# Patient Record
Sex: MALE | Race: WHITE | Employment: FULL TIME | URBAN - METROPOLITAN AREA
[De-identification: names, ages, dates, MRNs, and addresses within clinical notes are randomized per-mention and may not be internally consistent; named-entity substitution may affect disease eponyms.]

---

## 2017-01-20 ENCOUNTER — OFFICE VISIT (OUTPATIENT)
Dept: URGENT CARE | Facility: URGENT CARE | Age: 57
End: 2017-01-20
Payer: OTHER MISCELLANEOUS

## 2017-01-20 ENCOUNTER — RADIANT APPOINTMENT (OUTPATIENT)
Dept: GENERAL RADIOLOGY | Facility: CLINIC | Age: 57
End: 2017-01-20
Attending: FAMILY MEDICINE
Payer: OTHER MISCELLANEOUS

## 2017-01-20 VITALS
WEIGHT: 176 LBS | SYSTOLIC BLOOD PRESSURE: 104 MMHG | BODY MASS INDEX: 24.56 KG/M2 | DIASTOLIC BLOOD PRESSURE: 70 MMHG | TEMPERATURE: 98 F | OXYGEN SATURATION: 95 % | HEART RATE: 68 BPM

## 2017-01-20 DIAGNOSIS — S82.831A CLOSED FRACTURE OF DISTAL END OF RIGHT FIBULA, UNSPECIFIED FRACTURE MORPHOLOGY, INITIAL ENCOUNTER: Primary | ICD-10-CM

## 2017-01-20 DIAGNOSIS — S99.911A ANKLE INJURY, RIGHT, INITIAL ENCOUNTER: ICD-10-CM

## 2017-01-20 PROCEDURE — 99213 OFFICE O/P EST LOW 20 MIN: CPT | Performed by: FAMILY MEDICINE

## 2017-01-20 PROCEDURE — 73610 X-RAY EXAM OF ANKLE: CPT | Mod: RT

## 2017-01-20 NOTE — PROGRESS NOTES
SUBJECTIVE:  Chief Complaint   Patient presents with     Ankle Pain     rt ankle pain,slipped and fell at work     Work Comp   .hemalatha presents with a chief complaint of right ankle.  The injury occurred 1 hours ago.   The injury happened while at work.   How: trauma: rolled ankle, heard pop immediate pain  The patient complained of moderate and severe pain and has had decreased ROM.    Pain exacerbated by weight-bearing and movement    He treated it initially with ice.   This is the first time this type of injury has occurred to this patient.     Past Medical History   Diagnosis Date     Bipolar disorder (H)        Past Surgical History   Procedure Laterality Date     Colonoscopy  09/26/13     [facility]       Family History   Problem Relation Age of Onset     DIABETES Father      GASTROINTESTINAL DISEASE Father      multiple precancerous polyps     Cardiovascular Mother      cardiomyopathy     Depression Mother      Cancer - colorectal Maternal Uncle        Social History   Substance Use Topics     Smoking status: Current Every Day Smoker -- 1.00 packs/day     Types: Cigarettes     Smokeless tobacco: Never Used      Comment: 1/2 PPD     Alcohol Use: No     ROSINTEGUMENTARY/SKIN: NEGATIVE for open wound/bleeding and NEGATIVE for bruising  MUSCULOSKELETAL: NEGATIVE for joint swelling, paresthesias, radicular pain  NEURO: NEGATIVE for numbness, paresthesias, weakness    EXAM:/70 mmHg  Pulse 68  Temp(Src) 98  F (36.7  C) (Oral)  Wt 176 lb (79.833 kg)  SpO2 95% Gen: healthy,alert,no distress  Extremity: ankle has pain with palpation and rom.   There is not compromise to the distal circulation.  Pulses are +2 and CRT is brisk.GENERAL APPEARANCE: healthy, alert and no distress  EXTREMITIES: peripheral pulses normal  SKIN: no suspicious lesions or rashes  NEURO: Normal strength and tone, sensory exam grossly normal, mentation intact and speech normal    xr with distal fib fx, read by Dr. Scott  Maninder        ICD-10-CM    1. Closed fracture of distal end of right fibula, unspecified fracture morphology, initial encounter S82.831A order for DME     PODIATRY/FOOT & ANKLE SURGERY REFERRAL   2. Ankle injury, right, initial encounter S99.911A XR Ankle Right G/E 3 Views     order for DME     PODIATRY/FOOT & ANKLE SURGERY REFERRAL     RICE

## 2017-01-20 NOTE — NURSING NOTE
"Chief Complaint   Patient presents with     Ankle Pain     rt ankle pain,slipped and fell at work     Work Comp       Initial /70 mmHg  Pulse 68  Temp(Src) 98  F (36.7  C) (Oral)  Wt 176 lb (79.833 kg)  SpO2 95% Estimated body mass index is 24.56 kg/(m^2) as calculated from the following:    Height as of 10/12/15: 5' 11\" (1.803 m).    Weight as of this encounter: 176 lb (79.833 kg).  BP completed using cuff size: regular    "

## 2017-01-20 NOTE — MR AVS SNAPSHOT
After Visit Summary   1/20/2017    Colby Trent    MRN: [id number]           Patient Information     Date Of Birth          1960        Visit Information        Provider Department      1/20/2017 10:15 AM Tyler Dickens DO [facility]        Today's Diagnoses     Closed fracture of distal end of right fibula, unspecified fracture morphology, initial encounter    -  1     Ankle injury, right, initial encounter            Follow-ups after your visit        Additional Services     PODIATRY/FOOT & ANKLE SURGERY REFERRAL       Your provider has referred you to: FMG: [facility] [phone]   [email or web address]  FMG: [facility] [phone]   [email or web address]  FMG: [facility] [phone]   [email or web address]  FMG: [facility] [phone]   [email or web address]    Please be aware that coverage of these services is subject to the terms and limitations of your health insurance plan.  Call member services at your health plan with any benefit or coverage questions.      Please bring the following to your appointment:  >>   Any x-rays, CTs or MRIs which have been performed.  Contact the facility where they were done to arrange for  prior to your scheduled appointment.    >>   List of current medications   >>   This referral request   >>   Any documents/labs given to you for this referral                  Who to contact     If you have questions or need follow up information about today's clinic visit or your schedule please contact [facility] directly at [phone].  Normal or non-critical lab and imaging results will be communicated to you by MyChart, letter or phone within 4 business days after the clinic has received  "the results. If you do not hear from us within 7 days, please contact the clinic through [employer] or phone. If you have a critical or abnormal lab result, we will notify you by phone as soon as possible.  Submit refill requests through [employer] or call your pharmacy and they will forward the refill request to us. Please allow 3 business days for your refill to be completed.          Additional Information About Your Visit        [employer] Information     [employer] lets you send messages to your doctor, view your test results, renew your prescriptions, schedule appointments and more. To sign up, go to [email or web address] . Click on \"Log in\" on the left side of the screen, which will take you to the Welcome page. Then click on \"Sign up Now\" on the right side of the page.     You will be asked to enter the access code listed below, as well as some personal information. Please follow the directions to create your username and password.     Your access code is: [email or web address]  Expires: 2017 11:21 AM     Your access code will  in 90 days. If you need help or a new code, please call your [facility] clinic or [phone].        Care EveryWhere ID     This is your Care EveryWhere ID. This could be used by other organizations to access your [facility] medical records  [id number]        Your Vitals Were     Pulse Temperature Pulse Oximetry             68 98  F (36.7  C) (Oral) 95%          Blood Pressure from Last 3 Encounters:   17 104/70   10/12/15 114/62   09/03/15 122/72    Weight from Last 3 Encounters:   17 176 lb (79.833 kg)   10/12/15 171 lb 14.4 oz (77.973 kg)   09/03/15 171 lb 11.2 oz (77.883 kg)              We Performed the Following     PODIATRY/FOOT & ANKLE SURGERY REFERRAL     XR Ankle Right G/E 3 Views          Today's Medication Changes          These changes are accurate as of: 17 11:21 AM.  If you have any questions, ask your nurse or doctor.               Start taking these " medicines.        Dose/Directions    order for DME   Used for:  Closed fracture of distal end of right fibula, unspecified fracture morphology, initial encounter, Ankle injury, right, initial encounter   Started by:  Tyler Dickens DO        Equipment being ordered: long rt cam walker   Quantity:  1 Device   Refills:  0            Where to get your medicines      Some of these will need a paper prescription and others can be bought over the counter.  Ask your nurse if you have questions.     Bring a paper prescription for each of these medications    - order for DME             Primary Care Provider Office Phone # Fax #    Tyler Stone [phone]       [facility] [place] [address] [facility] [address]        Thank you!     Thank you for choosing [facility]  for your care. Our goal is always to provide you with excellent care. Hearing back from our patients is one way we can continue to improve our services. Please take a few minutes to complete the written survey that you may receive in the mail after your visit with us. Thank you!             Your Updated Medication List - Protect others around you: Learn how to safely use, store and throw away your medicines at www.disposemymeds.org.          This list is accurate as of: 1/20/17 11:21 AM.  Always use your most recent med list.                   Brand Name Dispense Instructions for use    lamoTRIgine 100 MG tablet    LaMICtal    270 tablet    Take 2 and 1/2 to 3 per day       MULTIVITAMIN/MINERALS PO          order for DME     1 Device    Equipment being ordered: long rt cam walker       propranolol 20 MG tablet    INDERAL    60 tablet    Take 1-2 tablets as needed 1/2 hour before stressful events       risperiDONE 2 MG tablet    risperDAL    180 tablet    Take 1/2 to 1 twice a day       traZODone 50 MG tablet    DESYREL    90 tablet    Take 1-3 tablets at bedtime       VITAMIN D (CHOLECALCIFEROL)  PO      Take 2,000 Units by mouth daily

## 2017-01-20 NOTE — Clinical Note
[address] [address]  [phone]        2017    REPORT OF WORK ABILITY    PATIENT DATA  Employee Name: Colby Trent        : 1960   [id number]  Work related injury: YES  Today's date: 2017  Date of injury: 2017     PROVIDER EVALUATION: Please fill in as needed.  Please give copy to employee for employer.  1. Diagnosis: Fracture distal fibula  2. Treatment: cam walker  3. Medication: OTC  NOTE: When ordering a medication, [place] Rules require Work Comp or WC on prescriptions.  4. Return to work date: after recheck with Podiatry next week      RESTRICTIONS: Unlimited unless listed.  Restrictions apply to home and leisure also.  If work within restrictions is not available, the employee is totally disabled.  Provider comments: none  Medical Examiner: Tyler Dickens      License or registration: [id number]    Next appointment: next week    CC: Employer, Managed Care Plan/Payor, Patient

## 2017-01-23 ENCOUNTER — MYC MEDICAL ADVICE (OUTPATIENT)
Dept: PODIATRY | Facility: CLINIC | Age: 57
End: 2017-01-23

## 2017-01-24 ENCOUNTER — OFFICE VISIT (OUTPATIENT)
Dept: PODIATRY | Facility: CLINIC | Age: 57
End: 2017-01-24
Payer: OTHER MISCELLANEOUS

## 2017-01-24 VITALS
HEART RATE: 79 BPM | HEIGHT: 71 IN | BODY MASS INDEX: 24.64 KG/M2 | WEIGHT: 176 LBS | SYSTOLIC BLOOD PRESSURE: 141 MMHG | DIASTOLIC BLOOD PRESSURE: 81 MMHG

## 2017-01-24 DIAGNOSIS — S82.891A ANKLE FRACTURE, RIGHT, CLOSED, INITIAL ENCOUNTER: Primary | ICD-10-CM

## 2017-01-24 PROCEDURE — 99204 OFFICE O/P NEW MOD 45 MIN: CPT | Performed by: PODIATRIST

## 2017-01-24 RX ORDER — HYDROCODONE BITARTRATE AND ACETAMINOPHEN 5; 325 MG/1; MG/1
1-2 TABLET ORAL EVERY 8 HOURS PRN
Qty: 30 TABLET | Refills: 0 | Status: SHIPPED | OUTPATIENT
Start: 2017-01-24 | End: 2017-05-08

## 2017-01-24 NOTE — PATIENT INSTRUCTIONS
Non weightbearing in boot on your right ankle.    Follow up in 2 weeks.  [facility] Locations    Monday Tuesday  [facility]  [address]         [id number]  [place] [address]      BREANNA Moralez [address]  Ph:  [phone]      Ph: [phone]  Fax: [phone]      Fax: [phone]    Wednesday Thursday - Surgery Day  [facility]     Call Therese @ [phone]  [address] [place] [address] [place] [address]  Ph: [phone]  Fax: [phone]    BLOOD CLOT PREVENTION - WEARING A CAST BOOT    Any brace that extends up to the knee can increase your chance of developing a blood clot. Blood clots generally occur in the large veins of your calf, thigh or abdomen. A blood clot may form when blood is stagnant in the veins while your leg is immobilized in the brace. Jennifer and relaxing the calf muscles by moving the ankle is the best method to avoid stagnant blood. Clarify with your doctor if you should be doing this as this may not be recommended for certain tendon surgeries.    Blood clots or deep venous thrombosis (DVT) can be life threatening if a portion of the clot breaks away and travels through the veins to your lungs. This is called a pulmonary embolism (PE) which can result in death.    Symptoms of a DVT may include swelling, tenderness, a warm feeling or redness in the leg.  DVT can occur in both legs, even if only one side is in a brace. If you have these symptoms, you should call your doctor immediately or go to the Emergency Room to have your leg scanned.     Symptoms of a pulmonary embolism (PE) include chest pain, shortness of breath or the need to breath rapidly that is not associated with exercise, difficulty breathing, rapid heart rate, coughing or coughing up blood, or a feeling of passing out. Chest pain can range from mild to knife-like pain while taking a deep breath. Pulmonary embolism  can occur without any symptoms whatsoever. You need to be evaluated in a hospital emergency room immediately if you have symptoms of a PE. Please call 911 as PE is a life-threatening emergency.    Be certain that you understand how much ankle range of motion is allowed. Your foot and ankle problem is being immobilized by the brace, yet we do not want you to develop a blood clot. The velcro strap braces are intended to be removed for periodic exercise of the ankle. Your doctor will tell you if it is okay to do this depending on the type of surgery you had.    Your own personal risk of developing a DVT or PE is dependent on many factors. A personal or family history of previous blood clot or a clotting disorder (such as thrombophilia) puts you at risk. Other risk factors include history of cancer, current use of estrogen medications (such as birth control pills or post menopausal medications), recent trauma or fracture, diabetes, recent heart attack, COPD, heart failure, pregnancy, obesity, advanced age, smoking, etc. Please make sure your doctor is aware if you have any of these risk factors.            Ankle Fracture, Distal Fibula  You have a fracture, or broken bone, of the end of the fibula bone. The fibula is one of two bones that support the ankle joint.    Home care    You will be given a splint, cast, or special boot to prevent movement at the injury site. Do not put weight on a splint. It will break. Follow your healthcare provider s advice about when to begin bearing weight on a cast or boot.    Keep your leg elevated when sitting or lying down. When sleeping, place a pillow under the injured leg. When sitting, support the injured leg so it is level with your waist. This is very important during the first 48 hours.    Keep the cast or splint completely dry at all times. When bathing, protect the cast or splint with 2 large plastic bags. Place 1 bag outside of the other. Tape each bag with duct tape at the  top end. Water can still leak in even when the foot is covered. So it's best to keep the cast, splint, or boot away from water. If a fiberglass cast or splint gets wet, dry it with a hair dryer on a cool setting.    Place an ice pack over the injured area for no more than 15 to 20 minutes. Do this every 3 to 6 hours for the first 24 to 48 hours. Continue this 3 to 4 times a day as needed. To make an ice pack, put ice cubes in a plastic bag that seals at the top. Wrap the bag in a clean, thin towel or cloth. Never put ice or an ice pack directly on the skin. The ice pack can be put right on the cast or splint. As the ice melts, be careful that the cast or splint doesn t get wet.    You may use over-the-counter pain medicine to control pain, unless another pain medicine was prescribed. If you have chronic liver or kidney disease or ever had a stomach ulcer or GI bleeding, talk with your provider before using these medicines.  Follow-up care  Follow up with your healthcare provider in 2 weeks. This is to be sure the bone is healing properly. If you were given a splint, it may be changed to a cast after the swelling goes down.  If X-rays were taken, you will be told of any new findings that may affect your care.  When to seek medical advice  Call your healthcare provider right away if any of these occur:    The plaster cast or splint becomes wet or soft    The fiberglass cast or splint stays wet for more than 24 hours    There is increased tightness or pain under the cast or splint    Your toes become swollen, cold, blue, numb, or tingly    The cast becomes loose    The cast has a bad smell    Sore areas develop under the cast    The cast develops cracks or breaks     8754-4660 [employer]. [address] [place] [address], [place] [address]. All rights reserved. This information is not intended as a substitute for professional medical care. Always follow your healthcare professional's instructions.      Smoking  Cessation    What's in cigarette smoke? - Cigarette smoke contains over 4,000 chemicals. Nicotine is one of the main ingredients which is an insecticide/herbicide. It is poisonous to our nervous system, increases blood clotting risk, and decreases the body's defenses to fight off infection. Another chemical is Carbon Monoxide is an asphyxiating gas that permanently binds to blood cells and blocks the transport of oxygen. This leads to tissue death and decreases your metabolism. Tar is a chemical that coats your lungs and trachea which impairs new oxygen coming in and carbon dioxide getting out of your body.   How does smoking impact surgery? - Smoking is particularly hazardous with regards to surgery. Surgery puts stress on the body and a smoker's body is already under strain from these chemicals. Putting the two together, especially for an elective surgery, could be a recipe for disaster. Smoking before and after surgery increases your risk of heart problems, slow wound healing, delayed bone healing, blood clots, wound infection and anesthesia complications.   What are the benefits of quitting? - In 20 minutes your blood pressure will drop back down to normal. In 8 hours the carbon monoxide (a toxic gas) levels in your blood stream will drop by half, and oxygen levels will return to normal. In 48 hours your chance of having a heart attack will have decreased. All nicotine will have left your body. Your sense of taste and smell will return to a normal level. In 72 hours your bronchial tubes will relax, and your energy levels will increase. In 2 weeks your circulation will increase, and it will continue to improve for the next 10 weeks.    Recommendations for elective surgery - Ideally, patients should quit smoking 8 weeks before and at least 2 weeks after elective surgery in order to avoid complications. Simply cutting back on the amount of cigarettes smoked per day does not offer any benefit or decrease the risk of  poor wound healing, heart problems, and infection. Smokers should also start taking Vitamin C and B for two weeks before surgery and two weeks after surgery.    Ways to Stop Smokin. Nicotine patches, lozenges, or gum  2. Support Groups  3. Medications (see below)    List of Medications:  1. Varenicline Tartrate (CHANTIX)   2. Bupropion HCL (WELLBUTRIN, ZYBAN) - note: make sure Wellbutrin is for smoking cessation and not other issues   3. Nicotine Patch (NICODERM)   4. Nicotine Inhaler (NICOTROL)   5. Nicotine Gum Nicotine Polacrilex   6. Nicotine Lozenge: Nicotine Polacrilex (COMMIT)   * [employer] offers a smoking support group as well!  Please visit: [email or web address]  If you are interested in these, ask about getting a prescription or talk to your primary care doctor about what may be the best way for you to quit.

## 2017-01-24 NOTE — MR AVS SNAPSHOT
After Visit Summary   1/24/2017    Colby Trent    MRN: [id number]           Patient Information     Date Of Birth          1960        Visit Information        Provider Department      1/24/2017 8:40 AM Andrew Castrejon DPM [facility]        Today's Diagnoses     Ankle fracture, right, closed, initial encounter    -  1       Care Instructions    Non weightbearing in boot on your right ankle.    Follow up in 2 weeks.  Dr. Castrejon[facility] Locations    Monday Tuesday  [facility]  [address]         [id number]  [place] [address]      [place] [address]  Ph:  [phone]      Ph: [phone]  Fax: [phone]      Fax: [phone]    Wednesday Thursday - Surgery Day  [facility]     Call Therese @ [phone]  [address] [place] [address]  Ph: [phone]  Fax: [phone]    BLOOD CLOT PREVENTION - WEARING A CAST BOOT    Any brace that extends up to the knee can increase your chance of developing a blood clot. Blood clots generally occur in the large veins of your calf, thigh or abdomen. A blood clot may form when blood is stagnant in the veins while your leg is immobilized in the brace. Jennifer and relaxing the calf muscles by moving the ankle is the best method to avoid stagnant blood. Clarify with your doctor if you should be doing this as this may not be recommended for certain tendon surgeries.    Blood clots or deep venous thrombosis (DVT) can be life threatening if a portion of the clot breaks away and travels through the veins to your lungs. This is called a pulmonary embolism (PE) which can result in death.    Symptoms of a DVT may include swelling, tenderness, a warm feeling or redness in the leg.  DVT can occur in both legs, even if only one side is in a brace. If you have these symptoms, you should call your doctor immediately or go to the  Emergency Room to have your leg scanned.     Symptoms of a pulmonary embolism (PE) include chest pain, shortness of breath or the need to breath rapidly that is not associated with exercise, difficulty breathing, rapid heart rate, coughing or coughing up blood, or a feeling of passing out. Chest pain can range from mild to knife-like pain while taking a deep breath. Pulmonary embolism can occur without any symptoms whatsoever. You need to be evaluated in a hospital emergency room immediately if you have symptoms of a PE. Please call 911 as PE is a life-threatening emergency.    Be certain that you understand how much ankle range of motion is allowed. Your foot and ankle problem is being immobilized by the brace, yet we do not want you to develop a blood clot. The velcro strap braces are intended to be removed for periodic exercise of the ankle. Your doctor will tell you if it is okay to do this depending on the type of surgery you had.    Your own personal risk of developing a DVT or PE is dependent on many factors. A personal or family history of previous blood clot or a clotting disorder (such as thrombophilia) puts you at risk. Other risk factors include history of cancer, current use of estrogen medications (such as birth control pills or post menopausal medications), recent trauma or fracture, diabetes, recent heart attack, COPD, heart failure, pregnancy, obesity, advanced age, smoking, etc. Please make sure your doctor is aware if you have any of these risk factors.            Ankle Fracture, Distal Fibula  You have a fracture, or broken bone, of the end of the fibula bone. The fibula is one of two bones that support the ankle joint.    Home care    You will be given a splint, cast, or special boot to prevent movement at the injury site. Do not put weight on a splint. It will break. Follow your healthcare provider s advice about when to begin bearing weight on a cast or boot.    Keep your leg elevated when  sitting or lying down. When sleeping, place a pillow under the injured leg. When sitting, support the injured leg so it is level with your waist. This is very important during the first 48 hours.    Keep the cast or splint completely dry at all times. When bathing, protect the cast or splint with 2 large plastic bags. Place 1 bag outside of the other. Tape each bag with duct tape at the top end. Water can still leak in even when the foot is covered. So it's best to keep the cast, splint, or boot away from water. If a fiberglass cast or splint gets wet, dry it with a hair dryer on a cool setting.    Place an ice pack over the injured area for no more than 15 to 20 minutes. Do this every 3 to 6 hours for the first 24 to 48 hours. Continue this 3 to 4 times a day as needed. To make an ice pack, put ice cubes in a plastic bag that seals at the top. Wrap the bag in a clean, thin towel or cloth. Never put ice or an ice pack directly on the skin. The ice pack can be put right on the cast or splint. As the ice melts, be careful that the cast or splint doesn t get wet.    You may use over-the-counter pain medicine to control pain, unless another pain medicine was prescribed. If you have chronic liver or kidney disease or ever had a stomach ulcer or GI bleeding, talk with your provider before using these medicines.  Follow-up care  Follow up with your healthcare provider in 2 weeks. This is to be sure the bone is healing properly. If you were given a splint, it may be changed to a cast after the swelling goes down.  If X-rays were taken, you will be told of any new findings that may affect your care.  When to seek medical advice  Call your healthcare provider right away if any of these occur:    The plaster cast or splint becomes wet or soft    The fiberglass cast or splint stays wet for more than 24 hours    There is increased tightness or pain under the cast or splint    Your toes become swollen, cold, blue, numb, or  tingly    The cast becomes loose    The cast has a bad smell    Sore areas develop under the cast    The cast develops cracks or breaks     9477-4548 [employer]. [address] [place] [address], [place] [address]. All rights reserved. This information is not intended as a substitute for professional medical care. Always follow your healthcare professional's instructions.      Smoking Cessation    What's in cigarette smoke? - Cigarette smoke contains over 4,000 chemicals. Nicotine is one of the main ingredients which is an insecticide/herbicide. It is poisonous to our nervous system, increases blood clotting risk, and decreases the body's defenses to fight off infection. Another chemical is Carbon Monoxide is an asphyxiating gas that permanently binds to blood cells and blocks the transport of oxygen. This leads to tissue death and decreases your metabolism. Tar is a chemical that coats your lungs and trachea which impairs new oxygen coming in and carbon dioxide getting out of your body.   How does smoking impact surgery? - Smoking is particularly hazardous with regards to surgery. Surgery puts stress on the body and a smoker's body is already under strain from these chemicals. Putting the two together, especially for an elective surgery, could be a recipe for disaster. Smoking before and after surgery increases your risk of heart problems, slow wound healing, delayed bone healing, blood clots, wound infection and anesthesia complications.   What are the benefits of quitting? - In 20 minutes your blood pressure will drop back down to normal. In 8 hours the carbon monoxide (a toxic gas) levels in your blood stream will drop by half, and oxygen levels will return to normal. In 48 hours your chance of having a heart attack will have decreased. All nicotine will have left your body. Your sense of taste and smell will return to a normal level. In 72 hours your bronchial tubes will relax, and your energy levels will  increase. In 2 weeks your circulation will increase, and it will continue to improve for the next 10 weeks.    Recommendations for elective surgery - Ideally, patients should quit smoking 8 weeks before and at least 2 weeks after elective surgery in order to avoid complications. Simply cutting back on the amount of cigarettes smoked per day does not offer any benefit or decrease the risk of poor wound healing, heart problems, and infection. Smokers should also start taking Vitamin C and B for two weeks before surgery and two weeks after surgery.    Ways to Stop Smokin. Nicotine patches, lozenges, or gum  2. Support Groups  3. Medications (see below)    List of Medications:  1. Varenicline Tartrate (CHANTIX)   2. Bupropion HCL (WELLBUTRIN, ZYBAN) - note: make sure Wellbutrin is for smoking cessation and not other issues   3. Nicotine Patch (NICODERM)   4. Nicotine Inhaler (NICOTROL)   5. Nicotine Gum Nicotine Polacrilex   6. Nicotine Lozenge: Nicotine Polacrilex (COMMIT)   * [place] offers a smoking support group as well!  Please visit: [email or web address]  If you are interested in these, ask about getting a prescription or talk to your primary care doctor about what may be the best way for you to quit.               Follow-ups after your visit        Follow-up notes from your care team     Return in about 2 weeks (around 2017).      Who to contact     If you have questions or need follow up information about today's clinic visit or your schedule please contact [facility] directly at [phone].  Normal or non-critical lab and imaging results will be communicated to you by MyChart, letter or phone within 4 business days after the clinic has received the results. If you do not hear from us within 7 days, please contact the clinic through MyChart or phone. If you have a critical or abnormal lab result, we will notify you by phone as soon as possible.  Submit refill requests  "through [facility] or call your pharmacy and they will forward the refill request to us. Please allow 3 business days for your refill to be completed.          Additional Information About Your Visit        [employer] Information     [facility] gives you secure access to your electronic health record. If you see a primary care provider, you can also send messages to your care team and make appointments. If you have questions, please call your primary care clinic.  If you do not have a primary care provider, please call [phone] and they will assist you.        Care EveryWhere ID     This is your Care EveryWhere ID. This could be used by other organizations to access your [facility] medical records  [id number]        Your Vitals Were     Pulse Height BMI (Body Mass Index)             79 5' 11\" (1.803 m) 24.56 kg/m2          Blood Pressure from Last 3 Encounters:   01/24/17 141/81   01/20/17 104/70   10/12/15 114/62    Weight from Last 3 Encounters:   01/24/17 176 lb (79.833 kg)   01/20/17 176 lb (79.833 kg)   10/12/15 171 lb 14.4 oz (77.973 kg)              Today, you had the following     No orders found for display         Today's Medication Changes          These changes are accurate as of: 1/24/17  8:54 AM.  If you have any questions, ask your nurse or doctor.               Start taking these medicines.        Dose/Directions    HYDROcodone-acetaminophen 5-325 MG per tablet   Commonly known as:  NORCO   Used for:  Ankle fracture, right, closed, initial encounter   Started by:  Andrew Castrejon DPM        Dose:  1-2 tablet   Take 1-2 tablets by mouth every 8 hours as needed for moderate to severe pain   Quantity:  30 tablet   Refills:  0            Where to get your medicines      Some of these will need a paper prescription and others can be bought over the counter.  Ask your nurse if you have questions.     Bring a paper prescription for each of these medications    - HYDROcodone-acetaminophen 5-325 MG per tablet "             Primary Care Provider Office Phone # Fax #    Tyler Stone [phone]       [facility] [address] [facility] [address]        Thank you!     Thank you for choosing [facility]  for your care. Our goal is always to provide you with excellent care. Hearing back from our patients is one way we can continue to improve our services. Please take a few minutes to complete the written survey that you may receive in the mail after your visit with us. Thank you!             Your Updated Medication List - Protect others around you: Learn how to safely use, store and throw away your medicines at www.disposemymeds.org.          This list is accurate as of: 1/24/17  8:54 AM.  Always use your most recent med list.                   Brand Name Dispense Instructions for use    HYDROcodone-acetaminophen 5-325 MG per tablet    NORCO    30 tablet    Take 1-2 tablets by mouth every 8 hours as needed for moderate to severe pain       lamoTRIgine 100 MG tablet    LaMICtal    270 tablet    Take 2 and 1/2 to 3 per day       MULTIVITAMIN/MINERALS PO          order for DME     1 Device    Equipment being ordered: long rt cam walker       propranolol 20 MG tablet    INDERAL    60 tablet    Take 1-2 tablets as needed 1/2 hour before stressful events       risperiDONE 2 MG tablet    risperDAL    180 tablet    Take 1/2 to 1 twice a day       traZODone 50 MG tablet    DESYREL    90 tablet    Take 1-3 tablets at bedtime       VITAMIN D (CHOLECALCIFEROL) PO      Take 2,000 Units by mouth daily

## 2017-01-24 NOTE — PROGRESS NOTES
PATIENT HISTORY:  Colby Trent is a 56 year old male who presents to clinic for a right ankle injury.  Occurred 6 days ago slipping on ice at work.  Pt is a .  3-9/10 pain.  He is in a CAM boot on crutches.  Requesting pain meds.  Smoker.      Review of Systems:  Patient denies fever, chills, rash, wound, stiffness, limping, numbness, weakness, heart burn, blood in stool, chest pain with activity, calf pain when walking, shortness of breath with activity, chronic cough, easy bleeding/bruising, swelling of ankles, excessive thirst, fatigue, depression, anxiety.       PAST MEDICAL HISTORY:   Past Medical History   Diagnosis Date     Bipolar disorder (H)         PAST SURGICAL HISTORY:   Past Surgical History   Procedure Laterality Date     Colonoscopy  09/26/13     [facility]        MEDICATIONS:   Current outpatient prescriptions:      HYDROcodone-acetaminophen (NORCO) 5-325 MG per tablet, Take 1-2 tablets by mouth every 8 hours as needed for moderate to severe pain, Disp: 30 tablet, Rfl: 0     order for DME, Equipment being ordered: long rt cam walker, Disp: 1 Device, Rfl: 0     lamoTRIgine (LAMICTAL) 100 MG tablet, Take 2 and 1/2 to 3 per day, Disp: 270 tablet, Rfl: 0     propranolol (INDERAL) 20 MG tablet, Take 1-2 tablets as needed 1/2 hour before stressful events, Disp: 60 tablet, Rfl: 0     VITAMIN D, CHOLECALCIFEROL, PO, Take 2,000 Units by mouth daily, Disp: , Rfl:      risperiDONE (RISPERDAL) 2 MG tablet, Take 1/2 to 1 twice a day, Disp: 180 tablet, Rfl: 2     Forrou-GlBgn-CyKsjq-FA-Omega (MULTIVITAMIN/MINERALS PO), , Disp: , Rfl:      traZODone (DESYREL) 50 MG tablet, Take 1-3 tablets at bedtime, Disp: 90 tablet, Rfl: 3     ALLERGIES:    Allergies   Allergen Reactions     No Known Drug Allergies         SOCIAL HISTORY:   Social History     Social History     Marital Status: Single     Spouse Name: N/A     Number of Children: N/A     Years of Education: N/A     Occupational History      "printer            Social History Main Topics     Smoking status: Current Every Day Smoker -- 1.00 packs/day     Types: Cigarettes     Smokeless tobacco: Never Used      Comment: 1/2 PPD     Alcohol Use: No     Drug Use: No     Sexual Activity: Not Currently     Other Topics Concern     Not on file     Social History Narrative        FAMILY HISTORY:   Family History   Problem Relation Age of Onset     DIABETES Father      GASTROINTESTINAL DISEASE Father      multiple precancerous polyps     Cardiovascular Mother      cardiomyopathy     Depression Mother      Cancer - colorectal Maternal Uncle         EXAM:Vitals: /81 mmHg  Pulse 79  Ht 5' 11\" (1.803 m)  Wt 176 lb (79.833 kg)  BMI 24.56 kg/m2  BMI= Body mass index is 24.56 kg/(m^2).    General appearance: Patient is alert and fully cooperative with history & exam.  No sign of distress is noted during the visit.     Psychiatric: Affect is pleasant & appropriate.  Patient appears motivated to improve health.     Respiratory: Breathing is regular & unlabored while sitting.     HEENT: Hearing is intact to spoken word.  Speech is clear.  No gross evidence of visual impairment that would impact ambulation.     Dermatologic: Skin is intact to right ankle without significant lesions, rash or abrasion.  Mild bruising noted.  No paronychia or evidence of soft tissue infection is noted.     Vascular: DP & PT pulses are intact & regular on the right.  Right lateral ankle edema noted, mild.  CFT and skin temperature are normal.     Neurologic: Lower extremity sensation is intact to light touch.  No evidence of weakness or contracture in the lower extremities.  No evidence of neuropathy.     Musculoskeletal: Right ankle exam with pain to palpation of lateral malleolus, lateral ligaments.  No medial ankle pain.  No significant syndesmotic pain.  No gross ankle deformity noted.  No foot or ankle joint effusion is noted.    XRs of right ankle reviewed with pt.  Nondisplaced " Cole B fx of distal fibula.       ASSESSMENT: Right ankle fx     PLAN:  Reviewed patient's chart in epic.  Discussed condition and treatment options including pros and cons.    Treatment options for the fracture were discussed.  Non-operative treatment would involve a period of immobilization, rest, bracing or casting and edema control.  There is potential for the fracture to heal without surgery yet there may still be a need for delayed surgery.  There is potential for non-union with any fracture.    A decision was made to pursue nonoperative care based on patient preference, fracture pattern, anticipated stability of the fracture, appearance of the soft tissue.    Non wt bearing in CAM boot advised.  RICE advised.  ACE given.  Pt has crutches.  He declined rollabout.    Letter given for work restrictions.  He cannot drive.  NWB for at least 6 wks advised.    Smoking cessation advised for bone healing.    Norco Rx given.  Advised minimal use.      We discussed immobilization and the risk of blood clot. Let raises, knee bends advised, but no ankle ROM.  Patient to seek medical attention if calf swelling and/or pain, chest pain, shortness of breath.    F/u in 2 wks.         Andrew Castrejon, VALENTIN, FACFAS

## 2017-01-24 NOTE — Clinical Note
[address] [address]  Phone: [phone]    January 24, 2017        Colby Trent  [address]  [facility] [address]          To whom it may concern:    RE: Colby Trent    Patient was seen and treated today at our clinic.    He will need to be nonweightbearing on the right ankle with a boot and crutches for at least 6 weeks.  He will be unable to drive during this period.    Please contact me for questions or concerns.      Sincerely,        Andrew Castrejon DPM

## 2017-01-24 NOTE — NURSING NOTE
"Chief Complaint   Patient presents with     Fracture     Right ankle fracture DOI: 1/20/17       Initial /81 mmHg  Pulse 79  Ht 5' 11\" (1.803 m)  Wt 176 lb (79.833 kg)  BMI 24.56 kg/m2 Estimated body mass index is 24.56 kg/(m^2) as calculated from the following:    Height as of this encounter: 5' 11\" (1.803 m).    Weight as of this encounter: 176 lb (79.833 kg).    JOSE Grey MA January 24, 2017 8:39 AM      "

## 2017-02-01 ENCOUNTER — TELEPHONE (OUTPATIENT)
Dept: PODIATRY | Facility: CLINIC | Age: 57
End: 2017-02-01

## 2017-02-01 DIAGNOSIS — S82.891D ANKLE FRACTURE, RIGHT, CLOSED, WITH ROUTINE HEALING, SUBSEQUENT ENCOUNTER: Primary | ICD-10-CM

## 2017-02-01 NOTE — TELEPHONE ENCOUNTER
Reason for Call:  Other call back    Detailed comments: Samara calling for patient.  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  She has never had a face to face with the patient so she does not know what type of boot he has and cannot advise on how to adjust it.  Please call patient at home number to advise on how to adjust boot for comfort.    Phone Number Patient can be reached at: Home number on file [phone] (home)    Best Time: any    Can we leave a detailed message on this number? YES    Call taken on 2/1/2017 at 12:49 PM by Kari Sebastian

## 2017-02-01 NOTE — TELEPHONE ENCOUNTER
Spoke with patient. He stated he would like to try an Aircast boot. Explained to patient that he can pick this up at the [facility] in [place]. Gave patient address to location, he stated he will pick it up tomorrow morning. Patient verbalized understanding and had no further questions. Order pended.  JOSE Grey MA February 1, 2017 3:24 PM

## 2017-02-02 ENCOUNTER — TELEPHONE (OUTPATIENT)
Dept: FAMILY MEDICINE | Facility: CLINIC | Age: 57
End: 2017-02-02

## 2017-02-02 NOTE — TELEPHONE ENCOUNTER
Reason for Call:  Other prescription    Detailed comments: The [facility] called and said they need Dr. Castrejon's COMFORT #  For a Script he wrote for Norco 5-325mg    Please call [facility] with this information  Thank You      Can we leave a detailed message on this number? YES    Call taken on 2/2/2017 at 12:10 PM by Ramon Grissom

## 2017-02-07 ENCOUNTER — RADIANT APPOINTMENT (OUTPATIENT)
Dept: GENERAL RADIOLOGY | Facility: CLINIC | Age: 57
End: 2017-02-07
Attending: PODIATRIST
Payer: OTHER MISCELLANEOUS

## 2017-02-07 ENCOUNTER — OFFICE VISIT (OUTPATIENT)
Dept: PODIATRY | Facility: CLINIC | Age: 57
End: 2017-02-07
Payer: OTHER MISCELLANEOUS

## 2017-02-07 VITALS
WEIGHT: 176 LBS | DIASTOLIC BLOOD PRESSURE: 84 MMHG | HEIGHT: 71 IN | HEART RATE: 86 BPM | BODY MASS INDEX: 24.64 KG/M2 | SYSTOLIC BLOOD PRESSURE: 135 MMHG

## 2017-02-07 DIAGNOSIS — S82.891D ANKLE FRACTURE, RIGHT, CLOSED, WITH ROUTINE HEALING, SUBSEQUENT ENCOUNTER: ICD-10-CM

## 2017-02-07 DIAGNOSIS — S82.891D ANKLE FRACTURE, RIGHT, CLOSED, WITH ROUTINE HEALING, SUBSEQUENT ENCOUNTER: Primary | ICD-10-CM

## 2017-02-07 PROCEDURE — 73610 X-RAY EXAM OF ANKLE: CPT | Mod: RT

## 2017-02-07 PROCEDURE — 99214 OFFICE O/P EST MOD 30 MIN: CPT | Performed by: PODIATRIST

## 2017-02-07 NOTE — PROGRESS NOTES
"PATIENT HISTORY:  Colby Trent is a 56 year old male who presents to clinic for recheck of a work related right ankle fracture.  1-4/10 pain.  He is NWB in a boot.  Admits to some WB.  Pt was given an Aircast boot, but it is broken, so he is going to get it replaced through [facility].  Denies fever, SOB.  Reports new R calf pain since prior visit.  Smoker.  Off work.  Nondiabetic.       EXAM:Vitals: /84 mmHg  Pulse 86  Ht 5' 11\" (1.803 m)  Wt 176 lb (79.833 kg)  BMI 24.56 kg/m2  BMI= Body mass index is 24.56 kg/(m^2).    General appearance: Patient is alert and fully cooperative with history & exam.  No sign of distress is noted during the visit.     Dermatologic: Skin is intact to right ankle.  No paronychia or evidence of soft tissue infection is noted.      Vascular: DP & PT pulses are intact & regular on the right.  Right lateral ankle edema improved.  CFT and skin temperature are normal.      Neurologic: Lower extremity sensation is intact to light touch.  No evidence of weakness or contracture in the lower extremities.  No evidence of neuropathy.      Musculoskeletal: Right ankle exam with pain to palpation of lateral malleolus.  No medial ankle pain.  No significant syndesmotic pain.  Right calf pain on exam with squeeze, but no edema or redness.  No gross ankle deformity noted.  No foot or ankle joint effusion is noted.    XRs of right ankle reviewed with pt.  Cole B fx of distal fibula.  Possibly a very minimal lateral shift of the distal fibular fragment, but mortise remains congruent, stable.  Radiologist read as stable.      ASSESSMENT: Right fibular fx, new R calf pain      PLAN:  Reviewed patient's chart in epic.  Discussed condition and treatment options including pros and cons.    Treatment options for the fracture were discussed.  Discussed nonoperative vs operative.  Pt would like to avoid surgery.  I am concerned about his post op compliance if surgery was undertaken.  Smoking also " complicates his course.  I think it is reasonable to continue nonoperative care given minimal displacement, no medial pain or evidence of instability.    Will continue with nonoperative care.      Non wt bearing in CAM boot advised.  RICE advised.  Pt has crutches.  He declined rollabout.    Smoking cessation advised for bone healing.    Will send for venous duplex US on the right LE to r/o DVT.  Discussed risk of DVT, fatal PE.  He will call to schedule this ASAP, which was advised.    Pt will remain off work for now.    F/u in 4 wks.      Andrew Castrejon, VALENTIN, FACFAS

## 2017-02-07 NOTE — PATIENT INSTRUCTIONS
Follow up in 4 weeks.    [facility] Locations    Monday Tuesday  [facility]  [address]         [id number]  [place] [address]      BREANNA Moralez [address]  Ph:  [phone]      Ph: [phone]  Fax: [phone]      Fax: [phone]    Wednesday Thursday - Surgery Day  [facility]     Call Therese @ [phone]  [address] [place] [address] BREANNA Aquino [address]  Ph: [phone]  Fax: [phone]    PRICE Therapy    Many aches and pains throughout the foot and ankle can be helped with many simple treatments.  This is usually described as PRICE Therapy.      P - Protection - often times, inflammation/pain in the lower extremity is not able to improve simply because the areas involved are never allowed to rest.  Every step we take can bother the problematic area.  Protecting those areas is an important step in the healing process.  This may involve a walking cast boot, a special insert/orthotic device, an ankle brace, or simply avoiding barefoot walking.    R - Rest - in addition to protecting the foot/ankle, resting is an important, but often times difficult, treatment option.  Getting off your feet when they bother you, and specifically avoiding activities that cause pain/discomfort, are very beneficial to prevent, and treat, foot/ankle pain.      I - Ice - icing regularly can help to decrease inflammation and swelling in the foot, thus decreasing pain.  Using an ice pack or a bag of frozen peas works very well.  Ice for 20 minutes multiple times per day as needed.  Do not place the ice directly on the skin as this can cause tissue damage.    C - Compression - using a compression wrap or an ACE wrap can help to decrease swelling, which can help to decrease pain.  Wearing the wraps is generally not needed at night, but they should be worn on a regular basis when you are going to be on your feet for prolonged  periods as gravity tends to pull fluids down to your feet/ankles.    E - Elevation - elevating your lower extremities multiple times daily for 15-20 minutes can help to decrease swelling, which works well in decreasing pain levels.      NSAID/Tylenol - An anti-inflammatory, like Aleve or ibuprofen, and/or a pain medication, such as Tylenol, can help to improve pain levels and get the issue resolved sooner rather than later.  Anyone with liver issues should be careful with Tylenol, and anyone with high blood pressure or heart, stomach or kidney issues should be careful with anti-inflammatories.  Please ask if you have questions about these medications, including dosage.        Smoking Cessation    What's in cigarette smoke? - Cigarette smoke contains over 4,000 chemicals. Nicotine is one of the main ingredients which is an insecticide/herbicide. It is poisonous to our nervous system, increases blood clotting risk, and decreases the body's defenses to fight off infection. Another chemical is Carbon Monoxide is an asphyxiating gas that permanently binds to blood cells and blocks the transport of oxygen. This leads to tissue death and decreases your metabolism. Tar is a chemical that coats your lungs and trachea which impairs new oxygen coming in and carbon dioxide getting out of your body.   How does smoking impact surgery? - Smoking is particularly hazardous with regards to surgery. Surgery puts stress on the body and a smoker's body is already under strain from these chemicals. Putting the two together, especially for an elective surgery, could be a recipe for disaster. Smoking before and after surgery increases your risk of heart problems, slow wound healing, delayed bone healing, blood clots, wound infection and anesthesia complications.   What are the benefits of quitting? - In 20 minutes your blood pressure will drop back down to normal. In 8 hours the carbon monoxide (a toxic gas) levels in your blood stream  will drop by half, and oxygen levels will return to normal. In 48 hours your chance of having a heart attack will have decreased. All nicotine will have left your body. Your sense of taste and smell will return to a normal level. In 72 hours your bronchial tubes will relax, and your energy levels will increase. In 2 weeks your circulation will increase, and it will continue to improve for the next 10 weeks.    Recommendations for elective surgery - Ideally, patients should quit smoking 8 weeks before and at least 2 weeks after elective surgery in order to avoid complications. Simply cutting back on the amount of cigarettes smoked per day does not offer any benefit or decrease the risk of poor wound healing, heart problems, and infection. Smokers should also start taking Vitamin C and B for two weeks before surgery and two weeks after surgery.    Ways to Stop Smokin. Nicotine patches, lozenges, or gum  2. Support Groups  3. Medications (see below)    List of Medications:  1. Varenicline Tartrate (CHANTIX)   2. Bupropion HCL (WELLBUTRIN, ZYBAN) - note: make sure Wellbutrin is for smoking cessation and not other issues   3. Nicotine Patch (NICODERM)   4. Nicotine Inhaler (NICOTROL)   5. Nicotine Gum Nicotine Polacrilex   6. Nicotine Lozenge: Nicotine Polacrilex (COMMIT)   * [facility] offers a smoking support group as well!  Please visit: [email or web address]  If you are interested in these, ask about getting a prescription or talk to your primary care doctor about what may be the best way for you to quit.

## 2017-02-07 NOTE — MR AVS SNAPSHOT
After Visit Summary   2/7/2017    Colby Trent    MRN: [id number]           Patient Information     Date Of Birth          1960        Visit Information        Provider Department      2/7/2017 9:00 AM Andrew Castrejon DPM [facility]        Today's Diagnoses     Ankle fracture, right, closed, with routine healing, subsequent encounter    -  1       Care Instructions    Follow up in 4 weeks.    [facility] Locations    Monday Tuesday  [facility]  [address]         [id number]  [place] [address]      [place] [address]  Ph:  [phone]      Ph: [phone]  Fax: [phone]      Fax: [phone]    Wednesday Thursday - Surgery Day  [facility]     Call Therese @ [phone]  [address] [place] [address]  Ph: [phone]  Fax: [phone]    ALEJO Therapy    Many aches and pains throughout the foot and ankle can be helped with many simple treatments.  This is usually described as PRICE Therapy.      P - Protection - often times, inflammation/pain in the lower extremity is not able to improve simply because the areas involved are never allowed to rest.  Every step we take can bother the problematic area.  Protecting those areas is an important step in the healing process.  This may involve a walking cast boot, a special insert/orthotic device, an ankle brace, or simply avoiding barefoot walking.    R - Rest - in addition to protecting the foot/ankle, resting is an important, but often times difficult, treatment option.  Getting off your feet when they bother you, and specifically avoiding activities that cause pain/discomfort, are very beneficial to prevent, and treat, foot/ankle pain.      I - Ice - icing regularly can help to decrease inflammation and swelling in the foot, thus decreasing pain.  Using an ice pack or a bag of frozen peas works  very well.  Ice for 20 minutes multiple times per day as needed.  Do not place the ice directly on the skin as this can cause tissue damage.    C - Compression - using a compression wrap or an ACE wrap can help to decrease swelling, which can help to decrease pain.  Wearing the wraps is generally not needed at night, but they should be worn on a regular basis when you are going to be on your feet for prolonged periods as gravity tends to pull fluids down to your feet/ankles.    E - Elevation - elevating your lower extremities multiple times daily for 15-20 minutes can help to decrease swelling, which works well in decreasing pain levels.      NSAID/Tylenol - An anti-inflammatory, like Aleve or ibuprofen, and/or a pain medication, such as Tylenol, can help to improve pain levels and get the issue resolved sooner rather than later.  Anyone with liver issues should be careful with Tylenol, and anyone with high blood pressure or heart, stomach or kidney issues should be careful with anti-inflammatories.  Please ask if you have questions about these medications, including dosage.        Smoking Cessation    What's in cigarette smoke? - Cigarette smoke contains over 4,000 chemicals. Nicotine is one of the main ingredients which is an insecticide/herbicide. It is poisonous to our nervous system, increases blood clotting risk, and decreases the body's defenses to fight off infection. Another chemical is Carbon Monoxide is an asphyxiating gas that permanently binds to blood cells and blocks the transport of oxygen. This leads to tissue death and decreases your metabolism. Tar is a chemical that coats your lungs and trachea which impairs new oxygen coming in and carbon dioxide getting out of your body.   How does smoking impact surgery? - Smoking is particularly hazardous with regards to surgery. Surgery puts stress on the body and a smoker's body is already under strain from these chemicals. Putting the two together,  especially for an elective surgery, could be a recipe for disaster. Smoking before and after surgery increases your risk of heart problems, slow wound healing, delayed bone healing, blood clots, wound infection and anesthesia complications.   What are the benefits of quitting? - In 20 minutes your blood pressure will drop back down to normal. In 8 hours the carbon monoxide (a toxic gas) levels in your blood stream will drop by half, and oxygen levels will return to normal. In 48 hours your chance of having a heart attack will have decreased. All nicotine will have left your body. Your sense of taste and smell will return to a normal level. In 72 hours your bronchial tubes will relax, and your energy levels will increase. In 2 weeks your circulation will increase, and it will continue to improve for the next 10 weeks.    Recommendations for elective surgery - Ideally, patients should quit smoking 8 weeks before and at least 2 weeks after elective surgery in order to avoid complications. Simply cutting back on the amount of cigarettes smoked per day does not offer any benefit or decrease the risk of poor wound healing, heart problems, and infection. Smokers should also start taking Vitamin C and B for two weeks before surgery and two weeks after surgery.    Ways to Stop Smokin. Nicotine patches, lozenges, or gum  2. Support Groups  3. Medications (see below)    List of Medications:  1. Varenicline Tartrate (CHANTIX)   2. Bupropion HCL (WELLBUTRIN, ZYBAN) - note: make sure Wellbutrin is for smoking cessation and not other issues   3. Nicotine Patch (NICODERM)   4. Nicotine Inhaler (NICOTROL)   5. Nicotine Gum Nicotine Polacrilex   6. Nicotine Lozenge: Nicotine Polacrilex (COMMIT)   * [facility] offers a smoking support group as well!  Please visit: [email or web address]  If you are interested in these, ask about getting a prescription or talk to your primary care doctor about what may be the best  "way for you to quit.               Follow-ups after your visit        Future tests that were ordered for you today     Open Future Orders        Priority Expected Expires Ordered    US Lower Extremity Venous Duplex Right Routine  2/7/2018 2/7/2017            Who to contact     If you have questions or need follow up information about today's clinic visit or your schedule please contact [facility] directly at [phone].  Normal or non-critical lab and imaging results will be communicated to you by [employer], letter or phone within 4 business days after the clinic has received the results. If you do not hear from us within 7 days, please contact the clinic through [employer] or phone. If you have a critical or abnormal lab result, we will notify you by phone as soon as possible.  Submit refill requests through [employer] or call your pharmacy and they will forward the refill request to us. Please allow 3 business days for your refill to be completed.          Additional Information About Your Visit        [employer] Systems Information     [employer] gives you secure access to your electronic health record. If you see a primary care provider, you can also send messages to your care team and make appointments. If you have questions, please call your primary care clinic.  If you do not have a primary care provider, please call [phone] and they will assist you.        Care EveryWhere ID     This is your Care EveryWhere ID. This could be used by other organizations to access your [facility] medical records  [id number]        Your Vitals Were     Pulse Height BMI (Body Mass Index)             86 5' 11\" (1.803 m) 24.56 kg/m2          Blood Pressure from Last 3 Encounters:   02/07/17 135/84   01/24/17 141/81   01/20/17 104/70    Weight from Last 3 Encounters:   02/07/17 176 lb (79.833 kg)   01/24/17 176 lb (79.833 kg)   01/20/17 176 lb (79.833 kg)               Primary Care Provider Office Phone # Fax #    Tyler Stone MD " [phone]       [facility] [address] [facility] [address]        Thank you!     Thank you for choosing [facility]  for your care. Our goal is always to provide you with excellent care. Hearing back from our patients is one way we can continue to improve our services. Please take a few minutes to complete the written survey that you may receive in the mail after your visit with us. Thank you!             Your Updated Medication List - Protect others around you: Learn how to safely use, store and throw away your medicines at www.disposemymeds.org.          This list is accurate as of: 2/7/17  9:20 AM.  Always use your most recent med list.                   Brand Name Dispense Instructions for use    HYDROcodone-acetaminophen 5-325 MG per tablet    NORCO    30 tablet    Take 1-2 tablets by mouth every 8 hours as needed for moderate to severe pain       lamoTRIgine 100 MG tablet    LaMICtal    270 tablet    Take 2 and 1/2 to 3 per day       MULTIVITAMIN/MINERALS PO          * order for DME     1 Device    Equipment being ordered: long rt cam walker       * order for DME     1 Device    Equipment being ordered: Tall aircast boot       propranolol 20 MG tablet    INDERAL    60 tablet    Take 1-2 tablets as needed 1/2 hour before stressful events       risperiDONE 2 MG tablet    risperDAL    180 tablet    Take 1/2 to 1 twice a day       traZODone 50 MG tablet    DESYREL    90 tablet    Take 1-3 tablets at bedtime       VITAMIN D (CHOLECALCIFEROL) PO      Take 2,000 Units by mouth daily       * Notice:  This list has 2 medication(s) that are the same as other medications prescribed for you. Read the directions carefully, and ask your doctor or other care provider to review them with you.

## 2017-02-07 NOTE — NURSING NOTE
"Chief Complaint   Patient presents with     RECHECK     2 week follow up R ankle Fx       Initial /84 mmHg  Pulse 86  Ht 5' 11\" (1.803 m)  Wt 176 lb (79.833 kg)  BMI 24.56 kg/m2 Estimated body mass index is 24.56 kg/(m^2) as calculated from the following:    Height as of this encounter: 5' 11\" (1.803 m).    Weight as of this encounter: 176 lb (79.833 kg).  Medication Reconciliation: josé miguel Grey MA February 7, 2017 8:55 AM      "

## 2017-02-08 ENCOUNTER — TELEPHONE (OUTPATIENT)
Dept: PODIATRY | Facility: CLINIC | Age: 57
End: 2017-02-08

## 2017-02-08 NOTE — Clinical Note
[address] [address]  Phone: [phone]    February 8, 2017        Colby Trent  [address]  [facility] [address]          To whom it may concern:    RE: Colby Trent    Patient was seen and treated at our clinic.    Patient needs to remain non weight bearing in a boot with crutches.  He may work if these restrictions can be accommodated.  These restrictions should be in effect until his follow up in 4 weeks, at which time they will be reevaluated.    Please contact me for questions or concerns.      Sincerely,        Andrew Castrejon DPM

## 2017-02-08 NOTE — TELEPHONE ENCOUNTER
Reason for Call:  Other Samara Cole working for [employer] called    Detailed comments: needs a work slip , ok to work non weigh bearing, need a note every time patient is seen even if nothing has changed    Phone Number Patient can be reached at: fax  any question call Samara's cell phone is [phone] secure voicemail    Best Time: any    Can we leave a detailed message on this number? YES    Call taken on 2/8/2017 at 3:44 PM by Katelynn Pereira

## 2017-02-08 NOTE — TELEPHONE ENCOUNTER
"Letter written.  Excerpt:     \"Patient needs to remain non weight bearing in a boot with crutches.  He may work if these restrictions can be accommodated.  These restrictions should be in effect until his follow up in 4 weeks, at which time they will be reevaluated.\"  "

## 2017-02-09 ENCOUNTER — HOSPITAL ENCOUNTER (EMERGENCY)
Facility: CLINIC | Age: 57
Discharge: HOME | End: 2017-02-09
Attending: EMERGENCY MEDICINE | Admitting: EMERGENCY MEDICINE
Payer: OTHER MISCELLANEOUS

## 2017-02-09 ENCOUNTER — HOSPITAL ENCOUNTER (OUTPATIENT)
Dept: ULTRASOUND IMAGING | Facility: CLINIC | Age: 57
Discharge: HOME | End: 2017-02-09
Attending: PODIATRIST | Admitting: PODIATRIST
Payer: OTHER MISCELLANEOUS

## 2017-02-09 VITALS
SYSTOLIC BLOOD PRESSURE: 115 MMHG | BODY MASS INDEX: 25.2 KG/M2 | TEMPERATURE: 98.3 F | HEIGHT: 71 IN | RESPIRATION RATE: 16 BRPM | HEART RATE: 78 BPM | DIASTOLIC BLOOD PRESSURE: 70 MMHG | WEIGHT: 180 LBS | OXYGEN SATURATION: 95 %

## 2017-02-09 DIAGNOSIS — I82.4Z1 ACUTE DEEP VEIN THROMBOSIS (DVT) OF DISTAL VEIN OF RIGHT LOWER EXTREMITY (H): ICD-10-CM

## 2017-02-09 DIAGNOSIS — S82.891D ANKLE FRACTURE, RIGHT, CLOSED, WITH ROUTINE HEALING, SUBSEQUENT ENCOUNTER: ICD-10-CM

## 2017-02-09 PROCEDURE — 93971 EXTREMITY STUDY: CPT | Mod: RT

## 2017-02-09 PROCEDURE — 25000132 ZZH RX MED GY IP 250 OP 250 PS 637: Performed by: EMERGENCY MEDICINE

## 2017-02-09 PROCEDURE — 99283 EMERGENCY DEPT VISIT LOW MDM: CPT

## 2017-02-09 RX ADMIN — APIXABAN 10 MG: 5 TABLET, FILM COATED ORAL at 17:20

## 2017-02-09 ASSESSMENT — ENCOUNTER SYMPTOMS
MYALGIAS: 1
SHORTNESS OF BREATH: 0

## 2017-02-09 NOTE — ED AVS SNAPSHOT
Emergency Department    [address] [place] [address]    Phone:  [phone]    Fax:  [phone]                                       Colby Trent   MRN: [id number]    Department:   Emergency Department   Date of Visit:  2/9/2017           After Visit Summary Signature Page     I have received my discharge instructions, and my questions have been answered. I have discussed any challenges I see with this plan with the nurse or doctor.    ..........................................................................................................................................  Patient/Patient Representative Signature      ..........................................................................................................................................  Patient Representative Print Name and Relationship to Patient    ..................................................               ................................................  Date                                            Time    ..........................................................................................................................................  Reviewed by Signature/Title    ...................................................              ..............................................  Date                                                            Time

## 2017-02-09 NOTE — ED PROVIDER NOTES
"  History     Chief Complaint:  Leg Pain      HPI   Colby Trent is a 56 year old male who presents to the emergency department today with leg pain. The patient fractured his right ankle on 01/20/17 and he has been ambulating with the assistance of crutches and a CAM boot. On Tuesday 02/07/17 the patient had onset of right posterior calf pain and he was seen by his orthopedist that day and his orthopedist was concerned about a potential DVT, which was performed today and showed a possible DVT and the patient was advised to present to the ED. Here, in the ED the patient states that he has persistent right posterior calf pain that is not as severe as its initial onset. Denies chest pain or shortness of breath.     US soft tissue right lower extremity performed today 02/09/17 as a outpatient:  IMPRESSION: Right below-knee deep venous thrombosis and peroneal veins  and soleal veins. Final reading per radiology     Allergies:  No Known Drug Allergies      Medications:    Norco   Lamictal   Inderal   Risperdal   Desyrel       Past Medical History:    Bipolar disorder      Past Surgical History:    Colonoscopy      Family History:    Father - Diabetes   Mother - Depression      Social History:  Smoking Status: Current Every Day Smoker -- 1.00 packs/day   Smokeless Tobacco: Never used  Alcohol Use: No     Marital Status:  Single     Review of Systems   Respiratory: Negative for shortness of breath.    Cardiovascular: Negative for chest pain.   Musculoskeletal: Positive for myalgias (Right posterior calf pain ).   All other systems reviewed and are negative.    Physical Exam   First Vitals:  BP: 123/74 mmHg  Pulse: 78  Heart Rate: 89  Temp: 98.3  F (36.8  C)  Resp: 16  Height: 180.3 cm (5' 11\")  Weight: 81.647 kg (180 lb)  SpO2: 97 %    Physical Exam  Nursing note and vitals reviewed.  Constitutional:  Appears well-developed and well-nourished, comfortable.   Reps:   Clear to auscultation. Some expiratory wheezes " noted.   Cardiac:  Heart sounds are normal. Good radial pulse.   Musc:   Some tenderness in the right calf just below the knee and down towards the ankle.     There is minimal edema below the knee.      There is no proximal tenderness over the thigh.   Skin:   No overlying redness.     Emergency Department Course     Interventions:  Eliquis 10 mg PO       Emergency Department Course:  Nursing notes and vitals reviewed.  I performed an exam of the patient as documented above.   I discussed the treatment plan with the patient. They expressed understanding of this plan and consented to discharge. They will be discharged home with instructions for care and follow up. In addition, the patient will return to the emergency department if their symptoms persist, worsen, if new symptoms arise or if there is any concern.  All questions were answered.      Impression & Plan      Medical Decision Making:  Colby Trent is a 56 year old male who presents to the emergency department today with right leg pain. The ultrasound was read as a DVT in one of the right proximal peroneal veins and soleal veins. I talked to the patient about the risk/benefits of blood thinners and he wants to go on one of the newer agents so he does not have to have blood testing done and I thought that this was reasonable. I talked with him about the possibility of propagation of this into the lung as a PE and what symptoms he needs to watch for and the caution or being on blood thinners. The first dose of Eliquis was given here 10 mg orally. He will follow up with his doctor next week. He will be on the 10 mg twice per day for a week and down to 5 mg's twice per day after that.     Diagnosis:    ICD-10-CM    1. Acute deep vein thrombosis (DVT) of distal vein of right lower extremity (H) I82.4Z1       Disposition:   Next dose of Eliquis tomorrow am and as directed. No razors, caution on ice and with sharp objects. Recheck in the clinic next week with  your doctor. If you hit your head or you develop chest pain with shortness of breath, recheck in the ER.    Discharge Medications:  Eliquis     Scribe Disclosure:  I, Arvind Mathur, am serving as a scribe at 3:24 PM on 2/9/2017 to document services personally performed by Layla Olivares MD, based on my observations and the provider's statements to me.   2/9/2017    EMERGENCY DEPARTMENT        Layla Olivares MD  02/09/17 [id number]

## 2017-02-09 NOTE — ED AVS SNAPSHOT
Emergency Department    [address] [facility] [address]    Phone:  [phone]    Fax:  [phone]                                       Colby Trent   MRN: [id number]    Department:   Emergency Department   Date of Visit:  2/9/2017           Patient Information     Date Of Birth          1960        Your diagnoses for this visit were:     Acute deep vein thrombosis (DVT) of distal vein of right lower extremity (H)        You were seen by Layla Olivares MD.      Follow-up Information     Schedule an appointment as soon as possible for a visit with Tyler Stone MD.    Specialty:  Internal Medicine    Contact information:    [facility]  [address] [facility] [address] [phone]          Discharge Instructions       Next dose of Eliquis tomorrow am and as directed.  No razors, caution on ice and with sharp objects.  Recheck in the clinic next week with your doctor.  If you hit your head or you develop chest pain with shortness of breath, recheck in the ER.        Understanding Deep Vein Thrombosis  Deep vein thrombosis (DVT) is a condition with a blood clot or thrombus in a deep vein. A blood clot is most common in the leg, but can develop in a large vein deep inside the leg, arm, or other part of the body. Part of the clot called an embolus can separate from the vein. It may travel to the lungs and form a pulmonary embolus (PE). This can cut off the flow of blood. A PE is a medical emergency and may cause death. Health care providers use the term venous thromboembolism (VTE) to describe the 2 conditions, DVT and PE. They use the term VTE because the 2 conditions are very closely related. And, because their prevention and treatment are closely related.  Over time, a blood clot can also permanently damage veins. To protect your health, a blood clot must be treated right away.  How DVT develops  The deep veins of the legs are located in the  muscles. These help carry blood from the legs to the heart. When leg muscles contract and relax, blood is squeezed through the veins back to the heart. One-way valves inside the veins help keep the blood moving in the right direction. When blood moves too slowly or not at all, it can pool in the veins. This makes a clot more likely to form.    Risk factors  Anyone can develop a blood clot. The following risk factors make a blood more likely to happen:    Being inactive for a long period, such as when you re in the hospital, or traveling by plane or car    Injury to a vein from an accident, a broken bone, or surgery    Having blood clots in the past    Personal or family history of a blood-clotting disorder    Recent surgery    Cancer and certain cancer treatments    Smoking  Other factors can also put you at higher risk for a blood clot. They include:    Age over 60 years    Pregnancy    Taking birth control or hormone replacement    Having other vein problems    Being overweight  Common symptoms  A blood clot does not always cause obvious symptoms. If you do have symptoms, they usually occur suddenly. They may include:    Pain, especially deep in the muscle    Swelling    Aching or tenderness    Red or warm skin  Call your health care provider if you have these symptoms.  Symptoms of pulmonary embolism may include:    Trouble breathing    Fast heartbeat    Chest pain    Sweating    Coughing (may cough up blood)    Fainting  Call 911 if you have these symptoms.   If you take medicine to help prevent blood clots, you have an increased risk of bleeding. Call 911 if you have heavy or uncontrolled bleeding. Call your health care provider if you have other signs or symptoms of bleeding like blood in the urine, bleeding with bowel movements, or bleeding from the nose, gums, a cut, or vagina.  Diagnosing DVT  Diagnosis begins with thorough questions about your symptoms and medical history along with a physical  exam.  Diagnostic tests include:    An imaging test called a duplex ultrasound. This test uses sound waves to create pictures of veins and blood flow.    Blood tests to check for clotting and other problems.  If your health care provider thinks you may have pulmonary embolism, additional testing will be done.  Treating DVT  Treating a blood clot may include:    Medicine to thin the blood and prevent pulmonary embolism and other complications.    Staying in the hospital. This may or may not be necessary.    Surgery for some people, like those who cannot take blood-thinning medicines.  Preventing DVT  Many people who are in the hospital are at an increased risk of developing blood clots. So, preventing blood clots is an important part of in-hospital care. The care may include getting out of bed regularly, taking medicine, or using special therapies or devices. Other factors and conditions may increase the risk of blood clots. Review your risk with your health care provider.    8344-3310 [employer]. [address] [place] [address]. All rights reserved. This information is not intended as a substitute for professional medical care. Always follow your healthcare professional's instructions.          Discharge References/Attachments     BLOOD THINNERS (ANTICOAGULANTS), USING (ENGLISH)    APIXABAN (ENGLISH)      Future Appointments        Provider Department Dept Phone Center    3/7/2017 7:40 AM Andrew Castrejon DPM [facility] [phone]       24 Hour Appointment Hotline       To make an appointment at any [facility], call [phone] ([phone]). If you don't have a family doctor or clinic, we will help you find one. [facility] are conveniently located to serve the needs of you and your family.             Review of your medicines      START taking        Dose / Directions Last dose taken    apixaban ANTICOAGULANT 5 MG tablet   Commonly known as:  ELIQUIS    Quantity:  70 tablet        Take 10 mg (2 tabs) 2 times daily for 13 doses, then 5 mg (1 tab) 2 times daily.   Refills:  0          Our records show that you are taking the medicines listed below. If these are incorrect, please call your family doctor or clinic.        Dose / Directions Last dose taken    HYDROcodone-acetaminophen 5-325 MG per tablet   Commonly known as:  NORCO   Dose:  1-2 tablet   Quantity:  30 tablet        Take 1-2 tablets by mouth every 8 hours as needed for moderate to severe pain   Refills:  0        lamoTRIgine 100 MG tablet   Commonly known as:  LaMICtal   Quantity:  270 tablet        Take 2 and 1/2 to 3 per day   Refills:  0        MULTIVITAMIN/MINERALS PO        Refills:  0        * order for DME   Quantity:  1 Device        Equipment being ordered: long rt cam walker   Refills:  0        * order for DME   Quantity:  1 Device        Equipment being ordered: Tall aircast boot   Refills:  0        propranolol 20 MG tablet   Commonly known as:  INDERAL   Quantity:  60 tablet        Take 1-2 tablets as needed 1/2 hour before stressful events   Refills:  0        risperiDONE 2 MG tablet   Commonly known as:  risperDAL   Quantity:  180 tablet        Take 1/2 to 1 twice a day   Refills:  2        traZODone 50 MG tablet   Commonly known as:  DESYREL   Quantity:  90 tablet        Take 1-3 tablets at bedtime   Refills:  3        VITAMIN D (CHOLECALCIFEROL) PO   Dose:  2000 Units        Take 2,000 Units by mouth daily   Refills:  0        * Notice:  This list has 2 medication(s) that are the same as other medications prescribed for you. Read the directions carefully, and ask your doctor or other care provider to review them with you.            Prescriptions were sent or printed at these locations (1 Prescription)                   Other Prescriptions                Printed at Department/Unit printer (1 of 1)         apixaban ANTICOAGULANT (ELIQUIS) 5 MG tablet                Orders Needing  Specimen Collection     None      Pending Results     Date and Time Order Name Status Description    2/9/2017 1404 US Lower Extremity Venous Duplex Right Preliminary             Pending Culture Results     No orders found from 2/8/2017 to 2/10/2017.             Test Results from your hospital stay            Clinical Quality Measure: Blood Pressure Screening     Your blood pressure was checked while you were in the emergency department today. The last reading we obtained was  BP: 112/90 mmHg . Please read the guidelines below about what these numbers mean and what you should do about them.  If your systolic blood pressure (the top number) is less than 120 and your diastolic blood pressure (the bottom number) is less than 80, then your blood pressure is normal. There is nothing more that you need to do about it.  If your systolic blood pressure (the top number) is 120-139 or your diastolic blood pressure (the bottom number) is 80-89, your blood pressure may be higher than it should be. You should have your blood pressure rechecked within a year by a primary care provider.  If your systolic blood pressure (the top number) is 140 or greater or your diastolic blood pressure (the bottom number) is 90 or greater, you may have high blood pressure. High blood pressure is treatable, but if left untreated over time it can put you at risk for heart attack, stroke, or kidney failure. You should have your blood pressure rechecked by a primary care provider within the next 4 weeks.  If your provider in the emergency department today gave you specific instructions to follow-up with your doctor or provider even sooner than that, you should follow that instruction and not wait for up to 4 weeks for your follow-up visit.        Thank you for choosing [facility]       Thank you for choosing [facility] for your care. Our goal is always to provide you with excellent care. Hearing back from our patients is one way we can continue to improve  our services. Please take a few minutes to complete the written survey that you may receive in the mail after you visit with us. Thank you!        [employer] Information     [employer] gives you secure access to your electronic health record. If you see a primary care provider, you can also send messages to your care team and make appointments. If you have questions, please call your primary care clinic.  If you do not have a primary care provider, please call [phone] and they will assist you.        Care EveryWhere ID     This is your Care EveryWhere ID. This could be used by other organizations to access your [facility] medical records  [id number]        After Visit Summary       This is your record. Keep this with you and show to your community pharmacist(s) and doctor(s) at your next visit.

## 2017-02-13 ENCOUNTER — TELEPHONE (OUTPATIENT)
Dept: PODIATRY | Facility: CLINIC | Age: 57
End: 2017-02-13

## 2017-02-14 NOTE — TELEPHONE ENCOUNTER
Reviewed chart, reviewed 2/9/17 US result note with the patient. He has follow up scheduled with PCP on 2/16/17. He understands results and has not questions at this time.

## 2017-02-14 NOTE — TELEPHONE ENCOUNTER
Patient calling in to state he is returning a call from Dr. Castrejon's nurse. I let patient know I did not see any encounter that someone had called him. Please call patient if there is anything you need to speak with him about. He thought maybe it had something to do with his ultrasound results. OK to leave a detailed message.    Also, I confirmed next appointments with patient at his request.    Emily Hairston  Patient Rep

## 2017-02-16 ENCOUNTER — OFFICE VISIT (OUTPATIENT)
Dept: INTERNAL MEDICINE | Facility: CLINIC | Age: 57
End: 2017-02-16
Payer: OTHER MISCELLANEOUS

## 2017-02-16 VITALS
TEMPERATURE: 98.3 F | SYSTOLIC BLOOD PRESSURE: 114 MMHG | HEIGHT: 71 IN | HEART RATE: 78 BPM | OXYGEN SATURATION: 98 % | WEIGHT: 180 LBS | DIASTOLIC BLOOD PRESSURE: 74 MMHG | BODY MASS INDEX: 25.2 KG/M2

## 2017-02-16 DIAGNOSIS — I82.431 ACUTE DEEP VEIN THROMBOSIS (DVT) OF POPLITEAL VEIN OF RIGHT LOWER EXTREMITY (H): Primary | ICD-10-CM

## 2017-02-16 DIAGNOSIS — Z72.0 TOBACCO ABUSE: ICD-10-CM

## 2017-02-16 PROCEDURE — 99214 OFFICE O/P EST MOD 30 MIN: CPT | Performed by: INTERNAL MEDICINE

## 2017-02-16 NOTE — MR AVS SNAPSHOT
After Visit Summary   2/16/2017    Colby Trent    MRN: [id number]           Patient Information     Date Of Birth          1960        Visit Information        Provider Department      2/16/2017 9:40 AM Tyler Stone MD [facility]        Today's Diagnoses     Acute deep vein thrombosis (DVT) of popliteal vein of right lower extremity (H)    -  1    Tobacco abuse           Follow-ups after your visit        Your next 10 appointments already scheduled     Mar 07, 2017  7:40 AM CST   [facility] with Andrew Cast[facility]jon DPM   [facility]    [address] [facility] [address] [phone]              Who to contact     If you have questions or need follow up information about today's clinic visit or your schedule please contact [facility] directly at [phone].  Normal or non-critical lab and imaging results will be communicated to you by MyChart, letter or phone within 4 business days after the clinic has received the results. If you do not hear from us within 7 days, please contact the clinic through [employer] or phone. If you have a critical or abnormal lab result, we will notify you by phone as soon as possible.  Submit refill requests through [employer] or call your pharmacy and they will forward the refill request to us. Please allow 3 business days for your refill to be completed.          Additional Information About Your Visit        MyChart Information     [employer] gives you secure access to your electronic health record. If you see a primary care provider, you can also send messages to your care team and make appointments. If you have questions, please call your primary care clinic.  If you do not have a primary care provider, please call [phone] and they will assist you.        Care EveryWhere ID     This is your Care EveryWhere ID. This could be used by other organizations to  "access your [facility] medical records  [id number]        Your Vitals Were     Pulse Temperature Height Pulse Oximetry BMI (Body Mass Index)       78 98.3  F (36.8  C) (Oral) 5' 11\" (1.803 m) 98% 25.1 kg/m2        Blood Pressure from Last 3 Encounters:   02/16/17 114/74   02/09/17 115/70   02/07/17 135/84    Weight from Last 3 Encounters:   02/16/17 180 lb (81.6 kg)   02/09/17 180 lb (81.6 kg)   02/07/17 176 lb (79.8 kg)              Today, you had the following     No orders found for display       Primary Care Provider Office Phone # Fax #    Tyler Stone [phone]       [facility] [address] [facility] [address]        Thank you!     Thank you for choosing [facility]  for your care. Our goal is always to provide you with excellent care. Hearing back from our patients is one way we can continue to improve our services. Please take a few minutes to complete the written survey that you may receive in the mail after your visit with us. Thank you!             Your Updated Medication List - Protect others around you: Learn how to safely use, store and throw away your medicines at www.disposemymeds.org.          This list is accurate as of: 2/16/17 10:04 AM.  Always use your most recent med list.                   Brand Name Dispense Instructions for use    apixaban ANTICOAGULANT 5 MG tablet    ELIQUIS    70 tablet    Take 10 mg (2 tabs) 2 times daily for 13 doses, then 5 mg (1 tab) 2 times daily.       HYDROcodone-acetaminophen 5-325 MG per tablet    NORCO    30 tablet    Take 1-2 tablets by mouth every 8 hours as needed for moderate to severe pain       lamoTRIgine 100 MG tablet    LaMICtal    270 tablet    Take 2 and 1/2 to 3 per day       MULTIVITAMIN/MINERALS PO          * order for DME     1 Device    Equipment being ordered: long rt cam walker       * order for DME     1 Device    Equipment being ordered: Tall aircast boot       propranolol 20 MG " tablet    INDERAL    60 tablet    Take 1-2 tablets as needed 1/2 hour before stressful events       risperiDONE 2 MG tablet    risperDAL    180 tablet    Take 1/2 to 1 twice a day       traZODone 50 MG tablet    DESYREL    90 tablet    Take 1-3 tablets at bedtime       VITAMIN D (CHOLECALCIFEROL) PO      Take 2,000 Units by mouth daily       * Notice:  This list has 2 medication(s) that are the same as other medications prescribed for you. Read the directions carefully, and ask your doctor or other care provider to review them with you.

## 2017-02-16 NOTE — PROGRESS NOTES
SUBJECTIVE:                                                    Colby Trent is a 56 year old male who presents to clinic today for the following health issues:    ED/UC Followup:    Facility:  [facility]  Date of visit: 2/9/17  Reason for visit: leg swelling  Current Status: stable   No prior DVT, FH or otherwise.    The patient fractured his right ankle on 01/20/17 and he has been ambulating with the assistance of crutches and a CAM boot. On Tuesday 02/07/17 the patient had onset of right posterior calf pain and he was seen by his orthopedist that day and his orthopedist was concerned about a potential DVT, which was performed today and showed a possible DVT and the patient was advised to present to the ED. Here, in the ED the patient states that he has persistent right posterior calf pain that is not as severe as its initial onset. Denies chest pain or shortness of breath.      US soft tissue right lower extremity performed today 02/09/17 as a outpatient:  IMPRESSION: Right below-knee deep venous thrombosis and peroneal veins  and soleal veins. Final reading per radiology      Problem list and histories reviewed & adjusted, as indicated.  Additional history: as documented    Patient Active Problem List   Diagnosis     CARDIOVASCULAR SCREENING; LDL GOAL LESS THAN 130     Bipolar disorder (H)     Plantar fascial fibromatosis     Tobacco abuse     Abnormal results of liver function studies     Past Surgical History   Procedure Laterality Date     Colonoscopy  09/26/13     [facility]       Social History   Substance Use Topics     Smoking status: Current Every Day Smoker     Packs/day: 1.00     Types: Cigarettes     Smokeless tobacco: Never Used      Comment: 1/2 PPD     Alcohol use No     Family History   Problem Relation Age of Onset     DIABETES Father      GASTROINTESTINAL DISEASE Father      multiple precancerous polyps     Cardiovascular Mother      cardiomyopathy     Depression Mother      Cancer -  colorectal Maternal Uncle          Current Outpatient Prescriptions   Medication Sig Dispense Refill     apixaban ANTICOAGULANT (ELIQUIS) 5 MG tablet Take 10 mg (2 tabs) 2 times daily for 13 doses, then 5 mg (1 tab) 2 times daily. 70 tablet 0     order for DME Equipment being ordered: Tall aircast boot 1 Device 0     HYDROcodone-acetaminophen (NORCO) 5-325 MG per tablet Take 1-2 tablets by mouth every 8 hours as needed for moderate to severe pain 30 tablet 0     order for DME Equipment being ordered: long rt cam walker 1 Device 0     lamoTRIgine (LAMICTAL) 100 MG tablet Take 2 and 1/2 to 3 per day 270 tablet 0     propranolol (INDERAL) 20 MG tablet Take 1-2 tablets as needed 1/2 hour before stressful events 60 tablet 0     VITAMIN D, CHOLECALCIFEROL, PO Take 2,000 Units by mouth daily       risperiDONE (RISPERDAL) 2 MG tablet Take 1/2 to 1 twice a day 180 tablet 2     [id number] (MULTIVITAMIN/MINERALS PO)        traZODone (DESYREL) 50 MG tablet Take 1-3 tablets at bedtime 90 tablet 3     Allergies   Allergen Reactions     No Known Drug Allergies      BP Readings from Last 3 Encounters:   02/09/17 115/70   02/07/17 135/84   01/24/17 141/81    Wt Readings from Last 3 Encounters:   02/09/17 180 lb (81.6 kg)   02/07/17 176 lb (79.8 kg)   01/24/17 176 lb (79.8 kg)            Labs reviewed in EPIC    ROS:  C: NEGATIVE for fever, chills, change in weight  E/M: NEGATIVE for ear, mouth and throat problems  R: NEGATIVE for significant cough or SOB  CV: NEGATIVE for chest pain, palpitations or peripheral edema  GI: NEGATIVE for nausea, abdominal pain, heartburn, or change in bowel habits  : NEGATIVE for frequency, dysuria, or hematuria  H: NEGATIVE for bleeding problems  P: NEGATIVE for changes in mood or affect    OBJECTIVE:                                                    /74 (BP Location: Left arm, Patient Position: Chair, Cuff Size: Adult Regular)  Pulse 78  Temp 98.3  F (36.8  C) (Oral)  Ht 5'  "11\" (1.803 m)  Wt 180 lb (81.6 kg)  SpO2 98%  BMI 25.1 kg/m2  Body mass index is 25.1 kg/(m^2).  GENERAL: alert and no distress  EYES: Eyes grossly normal to inspection, extraocular movements - intact, and PERRL  HENT: ear canals- normal; TMs- normal; Nose- normal; Mouth- no ulcers, no lesions  NECK: no tenderness, no adenopathy, no asymmetry, no masses, no stiffness; thyroid- normal to palpation  RESP: lungs clear to auscultation - no rales, no rhonchi, no wheezes  CV: regular rates and rhythm, normal S1 S2, no S3 or S4 and no click or rub   Boot on right foot.  PSYCH: Alert and oriented times 3; speech- coherent , normal rate and volume; able to articulate logical thoughts, able to abstract reason, no tangential thoughts, no hallucinations or delusions, affect- normal     ASSESSMENT/PLAN:                                                      (I82.431) Acute deep vein thrombosis (DVT) of popliteal vein of right lower extremity (H)  (primary encounter diagnosis)  Comment: on therapy as noted  Plan: Discussed as noted 3-6 months of therapy as orthopedic issues likely precipitating    (Z72.0) Tobacco abuse  Comment:   Plan: Smoking cessation was advised and the risks of continued smoking in regards to this patients health history was reiterated. Options of smoking cessation were also discussed. This time extended beyond the routine exam.      See Patient Instructions    Tyler Stone MD  [facility]    THE MEDICATION LIST HAS BEEN FULLY RECONCILED BY THE M.D. AND THE NURSING STAFF.      "

## 2017-02-16 NOTE — NURSING NOTE
"Chief Complaint   Patient presents with     ER F/U     Work Comp       Initial /74 (BP Location: Left arm, Patient Position: Chair, Cuff Size: Adult Regular)  Pulse 78  Temp 98.3  F (36.8  C) (Oral)  Ht 5' 11\" (1.803 m)  Wt 180 lb (81.6 kg)  SpO2 98%  BMI 25.1 kg/m2 Estimated body mass index is 25.1 kg/(m^2) as calculated from the following:    Height as of this encounter: 5' 11\" (1.803 m).    Weight as of this encounter: 180 lb (81.6 kg).  Medication Reconciliation: complete       "

## 2017-03-06 DIAGNOSIS — I82.431 ACUTE DEEP VEIN THROMBOSIS (DVT) OF POPLITEAL VEIN OF RIGHT LOWER EXTREMITY (H): Primary | ICD-10-CM

## 2017-03-06 NOTE — TELEPHONE ENCOUNTER
apixaban ANTICOAGULANT (ELIQUIS) 5 MG tablet  Last Written Prescription Date: 2/9/17  Last Fill Quantity: 70, # refills: 0  Last Office Visit with [facility], [facility] or [facility] prescribing provider: 2/16/17   Next 5 appointments (look out 90 days)     Mar 07, 2017  7:40 AM CST   Return Visit with Andrew Castrejon DPM   [facility]    [address] [facility] [id number] [phone]                   BP Readings from Last 3 Encounters:   02/16/17 114/74   02/09/17 115/70   02/07/17 135/84     Lab Results   Component Value Date    AST 15 09/03/2015     Lab Results   Component Value Date    ALT 25 09/03/2015     Creatinine   Date Value Ref Range Status   09/03/2015 1.05 0.66 - 1.25 mg/dL Final

## 2017-03-06 NOTE — TELEPHONE ENCOUNTER
Routing refill request to provider for review/approval because:  Labs not current:  CBC, LFT, Cr

## 2017-03-06 NOTE — TELEPHONE ENCOUNTER
Reason for call: Medication   If this is a refill request, has the caller requested the refill from the pharmacy already? Yes  Will the patient be using a [facility]? No  Name of the pharmacy and phone number for the current request: [facility] [address] [facility]    Name of the medication requested: Eliquis 5mg    Other request: Please call patient after approved and can     Phone Number Pt can be reached at: Home number on file [phone] (home)  Best Time: anytime  Can we leave a detailed message on this number? YES

## 2017-03-07 ENCOUNTER — OFFICE VISIT (OUTPATIENT)
Dept: PODIATRY | Facility: CLINIC | Age: 57
End: 2017-03-07
Payer: OTHER MISCELLANEOUS

## 2017-03-07 ENCOUNTER — RADIANT APPOINTMENT (OUTPATIENT)
Dept: GENERAL RADIOLOGY | Facility: CLINIC | Age: 57
End: 2017-03-07
Attending: PODIATRIST
Payer: OTHER MISCELLANEOUS

## 2017-03-07 ENCOUNTER — TELEPHONE (OUTPATIENT)
Dept: PODIATRY | Facility: CLINIC | Age: 57
End: 2017-03-07

## 2017-03-07 VITALS
HEIGHT: 71 IN | BODY MASS INDEX: 25.2 KG/M2 | DIASTOLIC BLOOD PRESSURE: 81 MMHG | WEIGHT: 180 LBS | SYSTOLIC BLOOD PRESSURE: 139 MMHG

## 2017-03-07 DIAGNOSIS — S82.891D ANKLE FRACTURE, RIGHT, CLOSED, WITH ROUTINE HEALING, SUBSEQUENT ENCOUNTER: Primary | ICD-10-CM

## 2017-03-07 DIAGNOSIS — S82.891D ANKLE FRACTURE, RIGHT, CLOSED, WITH ROUTINE HEALING, SUBSEQUENT ENCOUNTER: ICD-10-CM

## 2017-03-07 PROCEDURE — 99213 OFFICE O/P EST LOW 20 MIN: CPT | Performed by: PODIATRIST

## 2017-03-07 PROCEDURE — 73610 X-RAY EXAM OF ANKLE: CPT | Mod: RT

## 2017-03-07 NOTE — TELEPHONE ENCOUNTER
Faxed forms for work to number provided. Sent a copy to be scanned into chart.  JOSE Grey MA March 7, 2017 9:15 AM

## 2017-03-07 NOTE — TELEPHONE ENCOUNTER
Reason for Call:  Form, our goal is to have forms completed with 72 hours, however, some forms may require a visit or additional information.    Type of letter, form or note:  employer    Who is the form from?: Insurance comp    Where did the form come from:     What clinic location was the form placed at?: [facility]    Where the form was placed: pt needs W/C letter stating restrictions for returning to work    What number is listed as a contact on the form?: please fax letter to [phone] attn:[email or web address]       Additional comments: please call natalie for exact verbiage    Call taken on 3/7/2017 at 11:46 AM by Octavio Ashton

## 2017-03-07 NOTE — PATIENT INSTRUCTIONS
Begin protected weight bearing in the boot.  Follow up in 2 weeks.   Dr. Castrejon's Clinic Locations    Monday Tuesday  [facility]  [address]         [id number]  [place] [address]      BREANNA Moralez [address]  Ph:  [phone]      Ph: [phone]  Fax: [phone]      Fax: [phone]    Wednesday Thursday - Surgery Day  [facility]     Call Therese @ [phone]  [address] [place] [address]        [place] [address]  Ph: [phone]  Fax: [phone]    PRICE Therapy    Many aches and pains throughout the foot and ankle can be helped with many simple treatments.  This is usually described as PRICE Therapy.      P - Protection - often times, inflammation/pain in the lower extremity is not able to improve simply because the areas involved are never allowed to rest.  Every step we take can bother the problematic area.  Protecting those areas is an important step in the healing process.  This may involve a walking cast boot, a special insert/orthotic device, an ankle brace, or simply avoiding barefoot walking.    R - Rest - in addition to protecting the foot/ankle, resting is an important, but often times difficult, treatment option.  Getting off your feet when they bother you, and specifically avoiding activities that cause pain/discomfort, are very beneficial to prevent, and treat, foot/ankle pain.      I - Ice - icing regularly can help to decrease inflammation and swelling in the foot, thus decreasing pain.  Using an ice pack or a bag of frozen peas works very well.  Ice for 20 minutes multiple times per day as needed.  Do not place the ice directly on the skin as this can cause tissue damage.    C - Compression - using a compression wrap or an ACE wrap can help to decrease swelling, which can help to decrease pain.  Wearing the wraps is generally not needed at night, but they should be worn on a regular basis when you  are going to be on your feet for prolonged periods as gravity tends to pull fluids down to your feet/ankles.    E - Elevation - elevating your lower extremities multiple times daily for 15-20 minutes can help to decrease swelling, which works well in decreasing pain levels.      NSAID/Tylenol - An anti-inflammatory, like Aleve or ibuprofen, and/or a pain medication, such as Tylenol, can help to improve pain levels and get the issue resolved sooner rather than later.  Anyone with liver issues should be careful with Tylenol, and anyone with high blood pressure or heart, stomach or kidney issues should be careful with anti-inflammatories.  Please ask if you have questions about these medications, including dosage.

## 2017-03-07 NOTE — LETTER
[address] [address]  Phone: [phone]    March 7, 2017        Colby Trent  [address]  [facility] [address]          To whom it may concern:    RE: Colby Trent    Patient was seen and treated at our clinic.     Patient is weightbearing as tolerated on his right ankle in the cast boot.  These restrictions should be in effect until his follow up in 2 weeks, at which time they will be reevaluated.     Please contact me for questions or concerns.    Sincerely,        Andrew Castrejon DPM

## 2017-03-07 NOTE — PROGRESS NOTES
Weight management plan: Patient was referred to their PCP to discuss a diet and exercise plan.     JOSE Grey MA March 7, 2017 7:51 AM

## 2017-03-07 NOTE — PROGRESS NOTES
"PATIENT HISTORY:  Colby Trent is a 56 year old male who presents to clinic for recheck of a work related right ankle fracture.  0-0.5/10 pain.  He is NWB in a boot.  Pt sent for US last visit where DVT was found; pt went to ED and was started on Eliquis.  He is being followed by IM.  Denies fevers, SOB.  Smoker.  Off work.  Nondiabetic.       EXAM:/81 (BP Location: Right arm, Patient Position: Chair, Cuff Size: Adult Large)  Ht 5' 11\" (1.803 m)  Wt 180 lb (81.6 kg)  BMI 25.1 kg/m2    General appearance: Patient is alert and fully cooperative with history & exam.  No sign of distress is noted during the visit.     Dermatologic: Skin is intact to right ankle.  No paronychia or evidence of soft tissue infection is noted.      Vascular: DP & PT pulses are intact & regular on the right.  Right lateral ankle edema improved.  CFT and skin temperature are normal.      Neurologic: Lower extremity sensation is intact to light touch.  No evidence of weakness or contracture in the lower extremities.  No evidence of neuropathy.      Musculoskeletal: Right ankle exam with very minimal to no pain to palpation of lateral malleolus.  No other significant ankle pain.  No calf pain.  No gross ankle deformity noted.  No foot or ankle joint effusion is noted.    XRs of right ankle reviewed with pt.  Kelsey LEGGETT fx of distal fibula, stable.  Callus noted.      ASSESSMENT: Right fibula fx      PLAN:  Reviewed patient's chart in epic.  Discussed condition and treatment options including pros and cons.    Begin protected WB in CAM boot.  LISA advised.  Discussed a gradual protocol.    No driving until out of the boot.  Pt will remain off work for now.    [employer]/Work comp forms received.  Pt gave ok to fill these out and provide summary of our visit today.    PT ordered.    F/u in 2 wks.      Andrew Castrejon, VALENTIN, FACFAS        "

## 2017-03-07 NOTE — NURSING NOTE
"Chief Complaint   Patient presents with     Fracture     R ankle fracture       Initial /81 (BP Location: Right arm, Patient Position: Chair, Cuff Size: Adult Large)  Ht 5' 11\" (1.803 m)  Wt 180 lb (81.6 kg)  BMI 25.1 kg/m2 Estimated body mass index is 25.1 kg/(m^2) as calculated from the following:    Height as of this encounter: 5' 11\" (1.803 m).    Weight as of this encounter: 180 lb (81.6 kg).  Medication Reconciliation: josé miguel Grey MA March 7, 2017 7:51 AM      "

## 2017-03-07 NOTE — MR AVS SNAPSHOT
After Visit Summary   3/7/2017    Colby Trent    MRN: [id number]           Patient Information     Date Of Birth          1960        Visit Information        Provider Department      3/7/2017 7:40 AM Andrew Castrejon DPM [facility]        Today's Diagnoses     Ankle fracture, right, closed, with routine healing, subsequent encounter    -  1      Care Instructions    Begin protected weight bearing in the boot.  Follow up in 2 weeks.   Dr. Castrejon's Clinic Locations    Monday Tuesday  [facility]  [address]         [id number]  [place] [address]      [place] [address]  Ph:  [phone]      Ph: [phone]  Fax: [phone]      Fax: [phone]    Wednesday Thursday - Surgery Day  [facility]     Call Therese @ [phone]  [address] [place] [address]  Ph: [phone]  Fax: [phone]    ALEJO Therapy    Many aches and pains throughout the foot and ankle can be helped with many simple treatments.  This is usually described as PRICE Therapy.      P - Protection - often times, inflammation/pain in the lower extremity is not able to improve simply because the areas involved are never allowed to rest.  Every step we take can bother the problematic area.  Protecting those areas is an important step in the healing process.  This may involve a walking cast boot, a special insert/orthotic device, an ankle brace, or simply avoiding barefoot walking.    R - Rest - in addition to protecting the foot/ankle, resting is an important, but often times difficult, treatment option.  Getting off your feet when they bother you, and specifically avoiding activities that cause pain/discomfort, are very beneficial to prevent, and treat, foot/ankle pain.      I - Ice - icing regularly can help to decrease inflammation and swelling in the foot, thus decreasing pain.  Using  an ice pack or a bag of frozen peas works very well.  Ice for 20 minutes multiple times per day as needed.  Do not place the ice directly on the skin as this can cause tissue damage.    C - Compression - using a compression wrap or an ACE wrap can help to decrease swelling, which can help to decrease pain.  Wearing the wraps is generally not needed at night, but they should be worn on a regular basis when you are going to be on your feet for prolonged periods as gravity tends to pull fluids down to your feet/ankles.    E - Elevation - elevating your lower extremities multiple times daily for 15-20 minutes can help to decrease swelling, which works well in decreasing pain levels.      NSAID/Tylenol - An anti-inflammatory, like Aleve or ibuprofen, and/or a pain medication, such as Tylenol, can help to improve pain levels and get the issue resolved sooner rather than later.  Anyone with liver issues should be careful with Tylenol, and anyone with high blood pressure or heart, stomach or kidney issues should be careful with anti-inflammatories.  Please ask if you have questions about these medications, including dosage.              Follow-ups after your visit        Additional Services     SHAYY PT, HAND, AND CHIROPRACTIC REFERRAL       **This order will print in the [facility] Scheduling Office**    Physical Therapy, Hand Therapy and Chiropractic Care are available through:    *[facility]  *[facility]  *[facility]    Call one number to schedule at any of the above locations: [phone].    Your provider has referred you to: Physical Therapy at [facility] or [facility]    Indication/Reason for Referral: s/p right ankle fx  Onset of Illness: 6-7 wks  Therapy Orders: Evaluate and Treat  Special Programs: None  Special Request: Audrey Alba      Additional Comments for the Therapist or Chiropractor: n/a    Please be aware that coverage of these services is subject to the  "terms and limitations of your health insurance plan.  Call member services at your health plan with any benefit or coverage questions.      Please bring the following to your appointment:    *Your personal calendar for scheduling future appointments  *Comfortable clothing                  Who to contact     If you have questions or need follow up information about today's clinic visit or your schedule please contact [facility] directly at [phone].  Normal or non-critical lab and imaging results will be communicated to you by Road [employer], letter or phone within 4 business days after the clinic has received the results. If you do not hear from us within 7 days, please contact the clinic through [employer] or phone. If you have a critical or abnormal lab result, we will notify you by phone as soon as possible.  Submit refill requests through [employer] or call your pharmacy and they will forward the refill request to us. Please allow 3 business days for your refill to be completed.          Additional Information About Your Visit        Road Herohart Information     [employer] gives you secure access to your electronic health record. If you see a primary care provider, you can also send messages to your care team and make appointments. If you have questions, please call your primary care clinic.  If you do not have a primary care provider, please call [phone] and they will assist you.        Care EveryWhere ID     This is your Care EveryWhere ID. This could be used by other organizations to access your [place] medical records  [id number]        Your Vitals Were     Height BMI (Body Mass Index)                5' 11\" (1.803 m) 25.1 kg/m2           Blood Pressure from Last 3 Encounters:   03/07/17 139/81   02/16/17 114/74   02/09/17 115/70    Weight from Last 3 Encounters:   03/07/17 180 lb (81.6 kg)   02/16/17 180 lb (81.6 kg)   02/09/17 180 lb (81.6 kg)              We Performed the Following     SHAYY PT, HAND, AND " CHIROPRACTIC REFERRAL        Primary Care Provider Office Phone # Fax #    Tyler Stone [phone]       [facility] [address] [facility] [address]        Thank you!     Thank you for choosing [facility]  for your care. Our goal is always to provide you with excellent care. Hearing back from our patients is one way we can continue to improve our services. Please take a few minutes to complete the written survey that you may receive in the mail after your visit with us. Thank you!             Your Updated Medication List - Protect others around you: Learn how to safely use, store and throw away your medicines at www.disposemymeds.org.          This list is accurate as of: 3/7/17  8:17 AM.  Always use your most recent med list.                   Brand Name Dispense Instructions for use    apixaban ANTICOAGULANT 5 MG tablet    ELIQUIS    180 tablet    5 mg (1 tab) 2 times daily.       HYDROcodone-acetaminophen 5-325 MG per tablet    NORCO    30 tablet    Take 1-2 tablets by mouth every 8 hours as needed for moderate to severe pain       lamoTRIgine 100 MG tablet    LaMICtal    270 tablet    Take 2 and 1/2 to 3 per day       MULTIVITAMIN/MINERALS PO          order for DME     1 Device    Equipment being ordered: Tall aircast boot       propranolol 20 MG tablet    INDERAL    60 tablet    Take 1-2 tablets as needed 1/2 hour before stressful events       risperiDONE 2 MG tablet    risperDAL    180 tablet    Take 1/2 to 1 twice a day       traZODone 50 MG tablet    DESYREL    90 tablet    Take 1-3 tablets at bedtime       VITAMIN D (CHOLECALCIFEROL) PO      Take 2,000 Units by mouth daily

## 2017-03-09 ENCOUNTER — TELEPHONE (OUTPATIENT)
Dept: PODIATRY | Facility: CLINIC | Age: 57
End: 2017-03-09

## 2017-03-09 NOTE — TELEPHONE ENCOUNTER
Reason for Call:  letter    Detailed comments: The letter faxed on 3/7/17 never went through;  Pt's worker's comp , Samara Cole, is asking  If someone can fax it again and call her to confirm it at    [phone]  Fax#[phone]    Phone Number Patient can be reached at: Home number on file [phone] (home)        Call taken on 3/9/2017 at 8:53 AM by Carlotta Jolly

## 2017-03-09 NOTE — TELEPHONE ENCOUNTER
Letter printed and faxed to number below.    Yu Lassiter CMA ([facility])  Podiatry/Foot & Ankle Surgery  [facility]

## 2017-03-10 ENCOUNTER — TELEPHONE (OUTPATIENT)
Dept: PODIATRY | Facility: CLINIC | Age: 57
End: 2017-03-10

## 2017-03-10 NOTE — TELEPHONE ENCOUNTER
Letter speaks to his restrictions as to weightbearing, which is as tolerated in the boot.  I thought the pt had no lighter duty options and had to be able to drive as part of his work duties, so that's why the clinic note states no work.  If they can accommodate light erlin with no driving and weightbearing as tolerated in the boot that should be fine.  I would also advise calling the patient to make sure we are all on the same page.  Thanks.

## 2017-03-10 NOTE — TELEPHONE ENCOUNTER
Spoke with Julianne and informed her of the letter and the office note from 3/7/17. Since they contradict each other she is wondering if he is able to work if his employer can accomadate his resctritions of weight bearing as tolerated in the boot and no driving. Routed to Dr. Castrejon to advise. I informed her that I am off work around 3pm today and if she doesn't hear back from me today to call [place] Monday morning and to ask for Iveth.    Yu Lassiter CMA ([facility])  Podiatry/Foot & Ankle Surgery  [facility]

## 2017-03-10 NOTE — TELEPHONE ENCOUNTER
Called Rodo back and informed her of Dr. Castrejon's note below.  She spoke with pt and he agreed to this plan.    Yu Lassiter CMA ([facility])  Podiatry/Foot & Ankle Surgery  [facility]

## 2017-03-13 ENCOUNTER — TELEPHONE (OUTPATIENT)
Dept: INTERNAL MEDICINE | Facility: CLINIC | Age: 57
End: 2017-03-13

## 2017-03-13 NOTE — TELEPHONE ENCOUNTER
Prior authorization    Medication name [facility]  Insurance [facility]  Insurance ID number [id number]  Prior authorization faxed through cover my meds

## 2017-03-16 ENCOUNTER — THERAPY VISIT (OUTPATIENT)
Dept: PHYSICAL THERAPY | Facility: CLINIC | Age: 57
End: 2017-03-16
Payer: OTHER MISCELLANEOUS

## 2017-03-16 DIAGNOSIS — M25.571 RIGHT ANKLE PAIN: Primary | ICD-10-CM

## 2017-03-16 DIAGNOSIS — S82.839A: ICD-10-CM

## 2017-03-16 PROCEDURE — 97161 PT EVAL LOW COMPLEX 20 MIN: CPT | Mod: GP | Performed by: PHYSICAL THERAPIST

## 2017-03-16 PROCEDURE — 97110 THERAPEUTIC EXERCISES: CPT | Mod: GP | Performed by: PHYSICAL THERAPIST

## 2017-03-16 NOTE — PROGRESS NOTES
Subjective:                                       Pertinent medical history includes:  None.  Medical allergies: no.  Other surgeries include:  None reported.  Current medications:  None as reported by patient.  Current occupation is  .  Patient is working in normal job without restrictions.      Barriers include:  None as reported by patient.    Red flags:  None as reported by patient.                      Objective:    System    Physical Exam    General     ROS    Assessment/Plan:

## 2017-03-16 NOTE — MR AVS SNAPSHOT
After Visit Summary   3/16/2017    Colby Trent    MRN: [id number]           Patient Information     Date Of Birth          1960        Visit Information        Provider Department      3/16/2017 12:40 PM Ashely Henley PT [place] [facility]        Today's Diagnoses     Right ankle pain    -  1    Fracture of fibula, distal, closed           Follow-ups after your visit        Your next 10 appointments already scheduled     Mar 21, 2017  5:00 PM CDT   Return Visit with Andrew Castrejon DPM   [facility]    [address] [place] [address]   [phone]            Mar 21, 2017  5:20 PM CDT   SHAYY Extremity with Lu Wu PTA   [facility] [place] [facility] ([facility]  )    [address] [place], [place] [address]  [facility] [id number] [phone]            Mar 27, 2017  5:20 PM CDT   SHAYY Extremity with Lu Wu PTA   [place] [facility] ([facility]  )    [address] [place], [place] [address]  [facility] [id number] [phone]            Apr 04, 2017  5:10 PM CDT   SHAYY Extremity with Ashely Henley PT   [facility] [place] [facility] ([facility]  )    [address] [place], [place] [address]  [facility] [address] [phone]            Apr 11, 2017  5:10 PM CDT   SHAYY Extremity with Ashely Henley PT   [place] [facility] ([facility]  )    [address] [place], [place] [address]  [facility] [id number] [phone]            Apr 18, 2017  5:10 PM CDT   SHAYY Extremity with Ashely Henley PT   [place] [facility] ([facility]  )    [address] [place], [place] [address]  [facility] [address] [phone]              Who to contact     If you have questions or need follow up information about today's clinic visit or your schedule please contact [facility] directly at [phone].  Normal or non-critical lab and imaging results will be communicated to you by MyChart, letter or phone within 4 business days after the clinic has received the results. If you do not hear from us within 7 days, please contact the clinic through [employer] or phone. If you have a critical or abnormal lab result, we will notify you by phone as soon as possible.  Submit refill requests through [employer] or call your pharmacy and they will forward the refill request to us. Please allow 3 business days for your refill to be completed.          Additional Information About Your Visit        [employer] Information     [employer] gives you secure access to your electronic health record. If you see a primary care provider, you can also send messages to your care team and make appointments. If you have questions, please call your primary care clinic.  If you do not have a primary care provider, please call [phone] and they will assist you.        Care EveryWhere ID     This is your Care EveryWhere ID. This could be used by other organizations to access your [place] medical records  [id number]         Blood Pressure from Last 3 Encounters:   03/07/17 139/81   02/16/17 114/74   02/09/17 115/70    Weight from Last 3 Encounters:   03/07/17 81.6 kg (180 lb)   02/16/17 81.6 kg (180 lb)   02/09/17 81.6 kg (180 lb)              We Performed the Following     SHAYY Inital Eval Report     PT Eval, Low Complexity ([address])     Therapeutic Exercises        Primary Care Provider Office Phone # Fax #    Tyler Stone [phone]       [facility] [address] [place] [address]        Thank you!     Thank you for choosing [place] [facility] PHYSICAL THERAP[facility]  for your care. Our goal is always to provide you with excellent care. Hearing back from our patients is one way we can continue to improve our services. Please  take a few minutes to complete the written survey that you may receive in the mail after your visit with us. Thank you!             Your Updated Medication List - Protect others around you: Learn how to safely use, store and throw away your medicines at www.disposemymeds.org.          This list is accurate as of: 3/16/17  1:36 PM.  Always use your most recent med list.                   Brand Name Dispense Instructions for use    apixaban ANTICOAGULANT 5 MG tablet    ELIQUIS    180 tablet    5 mg (1 tab) 2 times daily.       HYDROcodone-acetaminophen 5-325 MG per tablet    NORCO    30 tablet    Take 1-2 tablets by mouth every 8 hours as needed for moderate to severe pain       lamoTRIgine 100 MG tablet    LaMICtal    270 tablet    Take 2 and 1/2 to 3 per day       MULTIVITAMIN/MINERALS PO          order for DME     1 Device    Equipment being ordered: Tall aircast boot       propranolol 20 MG tablet    INDERAL    60 tablet    Take 1-2 tablets as needed 1/2 hour before stressful events       risperiDONE 2 MG tablet    risperDAL    180 tablet    Take 1/2 to 1 twice a day       traZODone 50 MG tablet    DESYREL    90 tablet    Take 1-3 tablets at bedtime       VITAMIN D (CHOLECALCIFEROL) PO      Take 2,000 Units by mouth daily

## 2017-03-16 NOTE — PROGRESS NOTES
Subjective:    Colby Trent is a 56 year old male with a right ankle condition.  Condition occurred with:  A fall/slip.  Condition occurred: at work.  This is a new condition  Patient slipped on ice at work () sustaining right ankle (fibula) fracture on 1/20/2017.  He currently is in cam boot and walking without assistive device.  He has minimal pain, only after being on feet for a long time.  Patient was treated for DVT..    Patient reports pain:  Lateral.    Pain is described as aching and is intermittent and reported as 1/10 and 2/10.  Associated symptoms:  Loss of motion/stiffness. Pain is the same all the time.  Symptoms are exacerbated by walking and relieved by rest and ice.  Since onset symptoms are gradually improving.        General health as reported by patient is excellent.                                            Objective:      Gait:    Assistive Devices:  CAM            Ankle/Foot Evaluation  ROM:    AROM:    Dorsiflexion:  Left:   10  Right:   5  Plantarflexion:  Left:  55    Right:  50  Inversion:  Left:  20     Right:  16  Eversion:  8     Right:  4          LIGAMENT TESTING: not assessed                PALPATION: normal    EDEMA:   Left ankle edema present at: 21.5 in  Right ankle edema present at:  lateral and 22.5 in      Figure 8 left: 21.5 inFigure 8 right: 22.5 in  MOBILITY TESTING: not assessed              FUNCTIONAL TESTS: not assessed                                                          STRENGTH:  Min loss right ankle eversion strength (4-5) without pain complaint.    General     ROS    Assessment/Plan:      Patient is a 56 year old male with right side ankle complaints.    Patient has the following significant findings with corresponding treatment plan.                Diagnosis 1:  S/p right ankle/fibula fracture on 1/20/2017.  Pain -  hot/cold therapy, self management, education and home program  Decreased ROM/flexibility - manual therapy and therapeutic  exercise  Decreased strength - therapeutic exercise and therapeutic activities  Impaired gait - gait training  Decreased function - therapeutic activities    Therapy Evaluation Codes:   1) History comprised of:   Personal factors that impact the plan of care:      None.    Comorbidity factors that impact the plan of care are:      None.     Medications impacting care: None.  2) Examination of Body Systems comprised of:   Body structures and functions that impact the plan of care:      Ankle.   Activity limitations that impact the plan of care are:      Stairs and Walking.  3) Clinical presentation characteristics are:   Stable/Uncomplicated.  4) Decision-Making    Low complexity using standardized patient assessment instrument and/or measureable assessment of functional outcome.  Cumulative Therapy Evaluation is: Low complexity.    Previous and current functional limitations:  (See Goal Flow Sheet for this information)    Short term and Long term goals: (See Goal Flow Sheet for this information)     Communication ability:  Patient appears to be able to clearly communicate and understand verbal and written communication and follow directions correctly.  Treatment Explanation - The following has been discussed with the patient:   RX ordered/plan of care  Anticipated outcomes  Possible risks and side effects  This patient would benefit from PT intervention to resume normal activities.   Rehab potential is excellent.    Frequency:  1-2 X week, once daily  Duration:  for 8 visits  Discharge Plan:  Achieve all LTG.  Independent in home treatment program.  Reach maximal therapeutic benefit.    Please refer to the daily flowsheet for treatment today, total treatment time and time spent performing 1:1 timed codes.

## 2017-03-21 ENCOUNTER — OFFICE VISIT (OUTPATIENT)
Dept: PODIATRY | Facility: CLINIC | Age: 57
End: 2017-03-21
Payer: OTHER MISCELLANEOUS

## 2017-03-21 ENCOUNTER — THERAPY VISIT (OUTPATIENT)
Dept: PHYSICAL THERAPY | Facility: CLINIC | Age: 57
End: 2017-03-21
Payer: OTHER MISCELLANEOUS

## 2017-03-21 ENCOUNTER — RADIANT APPOINTMENT (OUTPATIENT)
Dept: GENERAL RADIOLOGY | Facility: CLINIC | Age: 57
End: 2017-03-21
Attending: PODIATRIST
Payer: OTHER MISCELLANEOUS

## 2017-03-21 VITALS
HEIGHT: 71 IN | DIASTOLIC BLOOD PRESSURE: 79 MMHG | WEIGHT: 180 LBS | BODY MASS INDEX: 25.2 KG/M2 | SYSTOLIC BLOOD PRESSURE: 123 MMHG | HEART RATE: 89 BPM

## 2017-03-21 DIAGNOSIS — S82.839A: ICD-10-CM

## 2017-03-21 DIAGNOSIS — M25.571 ACUTE RIGHT ANKLE PAIN: ICD-10-CM

## 2017-03-21 DIAGNOSIS — S82.891D ANKLE FRACTURE, RIGHT, CLOSED, WITH ROUTINE HEALING, SUBSEQUENT ENCOUNTER: ICD-10-CM

## 2017-03-21 DIAGNOSIS — S82.891D ANKLE FRACTURE, RIGHT, CLOSED, WITH ROUTINE HEALING, SUBSEQUENT ENCOUNTER: Primary | ICD-10-CM

## 2017-03-21 PROCEDURE — 97110 THERAPEUTIC EXERCISES: CPT | Mod: GP

## 2017-03-21 PROCEDURE — 99213 OFFICE O/P EST LOW 20 MIN: CPT | Performed by: PODIATRIST

## 2017-03-21 PROCEDURE — 73610 X-RAY EXAM OF ANKLE: CPT | Mod: RT

## 2017-03-21 NOTE — LETTER
"  3/21/2017       RE: Colby Trent  [address]  [facility] [address]           Dear Colleague,    Thank you for referring your patient, Colby Trent, to the [facility]. Please see a copy of my visit note below.    PATIENT HISTORY:  Colby Trent is a 56 year old male who presents to clinic for recheck of a work related right ankle fracture.  0/10 pain today.  0.5/10 pain at worst at this point.  He is WB in a boot.  Admits to walking out of boot w/o significant pain.  Denies fevers, SOB.  Smoker.  Off work.  Nondiabetic.       EXAM:/79 (BP Location: Right arm, Patient Position: Chair, Cuff Size: Adult Large)  Pulse 89  Ht 5' 11\" (1.803 m)  Wt 180 lb (81.6 kg)  BMI 25.1 kg/m2    General appearance: Patient is alert and fully cooperative with history & exam.  No sign of distress is noted during the visit.     Dermatologic: Skin is intact to right ankle.  No paronychia or evidence of soft tissue infection is noted.      Vascular: DP & PT pulses are intact & regular on the right.  Right lateral ankle edema improved.  CFT and skin temperature are normal.      Neurologic: Lower extremity sensation is intact to light touch.  No evidence of weakness or contracture in the lower extremities.  No evidence of neuropathy.      Musculoskeletal: Right ankle exam with no pain to palpation of lateral malleolus.  No other significant ankle pain.  No calf pain.  No gross ankle deformity noted.  No foot or ankle joint effusion is noted.    XRs of right ankle reviewed with pt.  Kelsey LEGGETT fx of distal fibula, stable.  Callus noted.      ASSESSMENT: Right fibula fx      PLAN:  Reviewed patient's chart in epic.  Discussed condition and treatment options including pros and cons.    Pt may transition out of boot as tolerated into supportive shoes.    Pt may return to work next Monday w/o restriction.    Continue PT.    F/u with me prn.      Andrew Castrejon DPM, FACFAS          Weight " management plan: Patient was referred to their PCP to discuss a diet and exercise plan.   JOSE Grey MA March 21, 2017 4:51 PM        Again, thank you for allowing me to participate in the care of your patient.        Sincerely,    Andrew Castrejon DPM

## 2017-03-21 NOTE — NURSING NOTE
"Chief Complaint   Patient presents with     Fracture     Follow up R ankle fracture       Initial /79 (BP Location: Right arm, Patient Position: Chair, Cuff Size: Adult Large)  Pulse 89  Ht 1.803 m (5' 11\")  Wt 81.6 kg (180 lb)  BMI 25.1 kg/m2 Estimated body mass index is 25.1 kg/(m^2) as calculated from the following:    Height as of this encounter: 1.803 m (5' 11\").    Weight as of this encounter: 81.6 kg (180 lb).  Medication Reconciliation: josé miguel Grey MA March 21, 2017 4:51 PM      "

## 2017-03-21 NOTE — PATIENT INSTRUCTIONS
Transition out of the boot as tolerated into supportive shoes  Continue physical therapy  Follow up as needed

## 2017-03-21 NOTE — PROGRESS NOTES
Weight management plan: Patient was referred to their PCP to discuss a diet and exercise plan.   JOSE Grey MA March 21, 2017 4:51 PM

## 2017-03-21 NOTE — LETTER
Colby Trent  [address] [facility] [address]        March 21, 2017           To Whom It May Concern:    Colby Trent was seen in our clinic. He may return to work 3/27/17 with no restrictions.      Sincerely,    Andrew Castrejon DPM

## 2017-03-21 NOTE — MR AVS SNAPSHOT
After Visit Summary   3/21/2017    Colby Trent    MRN: [id number]           Patient Information     Date Of Birth          1960        Visit Information        Provider Department      3/21/2017 5:00 PM Andrew Castrejon DPM [place] [facility]        Today's Diagnoses     Ankle fracture, right, closed, with routine healing, subsequent encounter    -  1      Care Instructions    Transition out of the boot as tolerated into supportive shoes  Continue physical therapy  Follow up as needed        Follow-ups after your visit        Your next 10 appointments already scheduled     Mar 21, 2017  5:20 PM CDT   [facility] Extremity with [facility]   [facility] ([facility] [place]  )    [address] [place] [address] [address]  [place] [id number] [phone]            Mar 27, 2017  5:20 PM CDT   SHAY[facility] Extremity with [facility]   [facility] ([facility]  )    [address] [place] [address] [address]  [place] [id number] [phone]            Apr 04, 2017  5:10 PM CDT   SHAYY Extremity with Ashely Henley, PT   [facility] ([facility] [place]  )    [address] [place] [address] [address]  [place] [id number] [phone]            Apr 11, 2017  5:10 PM CDT   SHAYY Extremity with Ashely Henley, PT   [facility] ([facility]  )    [address] [facility] [address]  [place] [id number] [phone]            Apr 18, 2017  5:10 PM CDT   SHAYY Extremity with Ashely Henley, PT   [facility] ([facility]  )    [address] [facility] [address]  [place] [id number] [phone]              Who to contact     If you have questions or need follow up information about today's clinic visit or your schedule please contact [facility] directly at [phone].  Normal or non-critical lab and imaging  "results will be communicated to you by MyChart, letter or phone within 4 business days after the clinic has received the results. If you do not hear from us within 7 days, please contact the clinic through [employer] or phone. If you have a critical or abnormal lab result, we will notify you by phone as soon as possible.  Submit refill requests through [employer] or call your pharmacy and they will forward the refill request to us. Please allow 3 business days for your refill to be completed.          Additional Information About Your Visit        [employer] Information     [employer] gives you secure access to your electronic health record. If you see a primary care provider, you can also send messages to your care team and make appointments. If you have questions, please call your primary care clinic.  If you do not have a primary care provider, please call [phone] and they will assist you.        Care EveryWhere ID     This is your Care EveryWhere ID. This could be used by other organizations to access your [place] medical records  [id number]        Your Vitals Were     Pulse Height BMI (Body Mass Index)             89 1.803 m (5' 11\") 25.1 kg/m2          Blood Pressure from Last 3 Encounters:   03/21/17 123/79   03/07/17 139/81   02/16/17 114/74    Weight from Last 3 Encounters:   03/21/17 81.6 kg (180 lb)   03/07/17 81.6 kg (180 lb)   02/16/17 81.6 kg (180 lb)               Primary Care Provider Office Phone # Fax #    Tyler Stone [phone]       [facility] [address] [place] [address]        Thank you!     Thank you for choosing [facility]  for your care. Our goal is always to provide you with excellent care. Hearing back from our patients is one way we can continue to improve our services. Please take a few minutes to complete the written survey that you may receive in the mail after your visit with us. Thank you!             Your Updated Medication List " - Protect others around you: Learn how to safely use, store and throw away your medicines at www.disposemymeds.org.          This list is accurate as of: 3/21/17  5:08 PM.  Always use your most recent med list.                   Brand Name Dispense Instructions for use    apixaban ANTICOAGULANT 5 MG tablet    ELIQUIS    180 tablet    5 mg (1 tab) 2 times daily.       HYDROcodone-acetaminophen 5-325 MG per tablet    NORCO    30 tablet    Take 1-2 tablets by mouth every 8 hours as needed for moderate to severe pain       lamoTRIgine 100 MG tablet    LaMICtal    270 tablet    Take 2 and 1/2 to 3 per day       MULTIVITAMIN/MINERALS PO          order for DME     1 Device    Equipment being ordered: Tall aircast boot       propranolol 20 MG tablet    INDERAL    60 tablet    Take 1-2 tablets as needed 1/2 hour before stressful events       risperiDONE 2 MG tablet    risperDAL    180 tablet    Take 1/2 to 1 twice a day       traZODone 50 MG tablet    DESYREL    90 tablet    Take 1-3 tablets at bedtime       VITAMIN D (CHOLECALCIFEROL) PO      Take 2,000 Units by mouth daily

## 2017-03-21 NOTE — PROGRESS NOTES
"PATIENT HISTORY:  Colby Trent is a 56 year old male who presents to clinic for recheck of a work related right ankle fracture.  0/10 pain today.  0.5/10 pain at worst at this point.  He is WB in a boot.  Admits to walking out of boot w/o significant pain.  Denies fevers, SOB.  Smoker.  Off work.  Nondiabetic.       EXAM:/79 (BP Location: Right arm, Patient Position: Chair, Cuff Size: Adult Large)  Pulse 89  Ht 5' 11\" (1.803 m)  Wt 180 lb (81.6 kg)  BMI 25.1 kg/m2    General appearance: Patient is alert and fully cooperative with history & exam.  No sign of distress is noted during the visit.     Dermatologic: Skin is intact to right ankle.  No paronychia or evidence of soft tissue infection is noted.      Vascular: DP & PT pulses are intact & regular on the right.  Right lateral ankle edema improved.  CFT and skin temperature are normal.      Neurologic: Lower extremity sensation is intact to light touch.  No evidence of weakness or contracture in the lower extremities.  No evidence of neuropathy.      Musculoskeletal: Right ankle exam with no pain to palpation of lateral malleolus.  No other significant ankle pain.  No calf pain.  No gross ankle deformity noted.  No foot or ankle joint effusion is noted.    XRs of right ankle reviewed with pt.  Kelsey LEGGETT fx of distal fibula, stable.  Callus noted.      ASSESSMENT: Right fibula fx      PLAN:  Reviewed patient's chart in epic.  Discussed condition and treatment options including pros and cons.    Pt may transition out of boot as tolerated into supportive shoes.    Pt may return to work next Monday w/o restriction.    Continue PT.    F/u with me prn.      Andrew Castrejon DPM, FACFAS        "

## 2017-03-28 ENCOUNTER — THERAPY VISIT (OUTPATIENT)
Dept: PHYSICAL THERAPY | Facility: CLINIC | Age: 57
End: 2017-03-28
Payer: OTHER MISCELLANEOUS

## 2017-03-28 DIAGNOSIS — S82.839A: ICD-10-CM

## 2017-03-28 DIAGNOSIS — M25.571 ACUTE RIGHT ANKLE PAIN: ICD-10-CM

## 2017-03-28 PROCEDURE — 97110 THERAPEUTIC EXERCISES: CPT | Mod: GP

## 2017-03-28 PROCEDURE — 97112 NEUROMUSCULAR REEDUCATION: CPT | Mod: GP

## 2017-03-28 PROCEDURE — 97140 MANUAL THERAPY 1/> REGIONS: CPT | Mod: GP

## 2017-04-04 ENCOUNTER — THERAPY VISIT (OUTPATIENT)
Dept: PHYSICAL THERAPY | Facility: CLINIC | Age: 57
End: 2017-04-04
Payer: OTHER MISCELLANEOUS

## 2017-04-04 DIAGNOSIS — M25.571 ACUTE RIGHT ANKLE PAIN: ICD-10-CM

## 2017-04-04 DIAGNOSIS — S82.839A: ICD-10-CM

## 2017-04-04 PROCEDURE — 97110 THERAPEUTIC EXERCISES: CPT | Mod: GP | Performed by: PHYSICAL THERAPIST

## 2017-04-04 PROCEDURE — 97112 NEUROMUSCULAR REEDUCATION: CPT | Mod: GP | Performed by: PHYSICAL THERAPIST

## 2017-04-04 PROCEDURE — 97140 MANUAL THERAPY 1/> REGIONS: CPT | Mod: GP | Performed by: PHYSICAL THERAPIST

## 2017-04-05 ENCOUNTER — TELEPHONE (OUTPATIENT)
Dept: INTERNAL MEDICINE | Facility: CLINIC | Age: 57
End: 2017-04-05

## 2017-04-05 NOTE — TELEPHONE ENCOUNTER
Samara his  was called back with Dr Warren message  Colby will make appt for May to sarah Cole LPN

## 2017-04-05 NOTE — TELEPHONE ENCOUNTER
Reason for Call:  Other call back    Detailed comments: The time line for DVT therapy? Are you going to recheck with a MRI in three to Six months. Please call.    Phone Number Patient can be reached at: [phone]    Best Time: anytime    Can we leave a detailed message on this number? YES (secured voicemail)    Call taken on 4/5/2017 at 12:37 PM by CHIKA DRIVER

## 2017-04-11 ENCOUNTER — THERAPY VISIT (OUTPATIENT)
Dept: PHYSICAL THERAPY | Facility: CLINIC | Age: 57
End: 2017-04-11
Payer: OTHER MISCELLANEOUS

## 2017-04-11 DIAGNOSIS — S82.839A: ICD-10-CM

## 2017-04-11 DIAGNOSIS — M25.571 ACUTE RIGHT ANKLE PAIN: ICD-10-CM

## 2017-04-11 PROCEDURE — 97140 MANUAL THERAPY 1/> REGIONS: CPT | Mod: GP | Performed by: PHYSICAL THERAPIST

## 2017-04-11 PROCEDURE — 97110 THERAPEUTIC EXERCISES: CPT | Mod: GP | Performed by: PHYSICAL THERAPIST

## 2017-04-11 PROCEDURE — 97112 NEUROMUSCULAR REEDUCATION: CPT | Mod: GP | Performed by: PHYSICAL THERAPIST

## 2017-04-18 ENCOUNTER — THERAPY VISIT (OUTPATIENT)
Dept: PHYSICAL THERAPY | Facility: CLINIC | Age: 57
End: 2017-04-18
Payer: OTHER MISCELLANEOUS

## 2017-04-18 DIAGNOSIS — S82.839A: ICD-10-CM

## 2017-04-18 DIAGNOSIS — M25.571 ACUTE RIGHT ANKLE PAIN: ICD-10-CM

## 2017-04-18 PROCEDURE — 97140 MANUAL THERAPY 1/> REGIONS: CPT | Mod: GP

## 2017-04-18 PROCEDURE — 97110 THERAPEUTIC EXERCISES: CPT | Mod: GP

## 2017-04-18 PROCEDURE — 97112 NEUROMUSCULAR REEDUCATION: CPT | Mod: GP

## 2017-05-08 ENCOUNTER — OFFICE VISIT (OUTPATIENT)
Dept: INTERNAL MEDICINE | Facility: CLINIC | Age: 57
End: 2017-05-08
Payer: OTHER MISCELLANEOUS

## 2017-05-08 VITALS
TEMPERATURE: 98.6 F | HEART RATE: 74 BPM | WEIGHT: 178.2 LBS | SYSTOLIC BLOOD PRESSURE: 114 MMHG | OXYGEN SATURATION: 94 % | DIASTOLIC BLOOD PRESSURE: 80 MMHG | BODY MASS INDEX: 24.85 KG/M2

## 2017-05-08 DIAGNOSIS — I82.431 ACUTE DEEP VEIN THROMBOSIS (DVT) OF POPLITEAL VEIN OF RIGHT LOWER EXTREMITY (H): Primary | ICD-10-CM

## 2017-05-08 PROCEDURE — 99213 OFFICE O/P EST LOW 20 MIN: CPT | Performed by: INTERNAL MEDICINE

## 2017-05-08 NOTE — PROGRESS NOTES
SUBJECTIVE:                                                    Colby Trent is a 56 year old male who presents to clinic today for the following health issues:    Discuss continuing therapy for DVT of popliteal vein of right lower extremity. Has been on Eliquis 5 mg bid since 3/6/17.       Problem list and histories reviewed & adjusted, as indicated.  Additional history: as documented    Patient Active Problem List   Diagnosis     CARDIOVASCULAR SCREENING; LDL GOAL LESS THAN 130     Bipolar disorder (H)     Plantar fascial fibromatosis     Tobacco abuse     Abnormal results of liver function studies     Acute deep vein thrombosis (DVT) of popliteal vein of right lower extremity (H)     Right ankle pain     Fracture of fibula, distal, closed     Past Surgical History:   Procedure Laterality Date     COLONOSCOPY  09/26/13    [facility]       Social History   Substance Use Topics     Smoking status: Current Every Day Smoker     Packs/day: 1.00     Types: Cigarettes     Smokeless tobacco: Never Used      Comment: 1/2 PPD     Alcohol use No     Family History   Problem Relation Age of Onset     DIABETES Father      GASTROINTESTINAL DISEASE Father      multiple precancerous polyps     Cardiovascular Mother      cardiomyopathy     Depression Mother      Cancer - colorectal Maternal Uncle          Current Outpatient Prescriptions   Medication Sig Dispense Refill     apixaban ANTICOAGULANT (ELIQUIS) 5 MG tablet 5 mg (1 tab) 2 times daily. 180 tablet 1     propranolol (INDERAL) 20 MG tablet Take 1-2 tablets as needed 1/2 hour before stressful events 60 tablet 0     VITAMIN D, CHOLECALCIFEROL, PO Take 2,000 Units by mouth daily       risperiDONE (RISPERDAL) 2 MG tablet Take 1/2 to 1 twice a day 180 tablet 2     [id number] (MULTIVITAMIN/MINERALS PO)        traZODone (DESYREL) 50 MG tablet Take 1-3 tablets at bedtime 90 tablet 3     order for DME Equipment being ordered: Tall aircast boot 1  Device 0     lamoTRIgine (LAMICTAL) 100 MG tablet Take 2 and 1/2 to 3 per day 270 tablet 0     Allergies   Allergen Reactions     No Known Drug Allergies      BP Readings from Last 3 Encounters:   05/08/17 114/80   03/21/17 123/79   03/07/17 139/81    Wt Readings from Last 3 Encounters:   05/08/17 178 lb 3.2 oz (80.8 kg)   03/21/17 180 lb (81.6 kg)   03/07/17 180 lb (81.6 kg)                    Reviewed and updated as needed this visit by clinical staff  Tobacco  Allergies  Med Hx  Surg Hx  Fam Hx  Soc Hx      Reviewed and updated as needed this visit by Provider         ROS:  C: NEGATIVE for fever, chills, change in weight  E/M: NEGATIVE for ear, mouth and throat problems  R: NEGATIVE for significant cough or SOB  CV: NEGATIVE for chest pain, palpitations or peripheral edema  GI: NEGATIVE for nausea, abdominal pain, heartburn, or change in bowel habits  : NEGATIVE for frequency, dysuria, or hematuria  M: NEGATIVE for significant arthralgias or myalgia  H: NEGATIVE for bleeding problems  P: NEGATIVE for changes in mood or affect    OBJECTIVE:                                                    /80  Pulse 74  Temp 98.6  F (37  C) (Oral)  Wt 178 lb 3.2 oz (80.8 kg)  SpO2 94%  BMI 24.85 kg/m2  Body mass index is 24.85 kg/(m^2).  GENERAL: healthy, alert and no distress  RESP: lungs clear to auscultation - no rales, no rhonchi, no wheezes  CV: regular rates and rhythm, normal S1 S2, no S3 or S4 and no murmur, no click or rub -  MS: extremities- no gross deformities noted, no edema  PSYCH: Alert and oriented times 3; speech- coherent , normal rate and volume; able to articulate logical thoughts, able to abstract reason, no tangential thoughts, no hallucinations or delusions, affect- normal       ASSESSMENT/PLAN:                                                      (I82.431) Acute deep vein thrombosis (DVT) of popliteal vein of right lower extremity (H)  (primary encounter diagnosis)  Comment: appears as  isolated, provoked DVT due to orthopedic cast and immobilization.  Plan: Suggest minimum 3 months therapy then cessation of therapy, advised of rationale and he is agreeable.  Discussed recurrence issues in setting of tobacco use and being a .    Smoking cessation was advised and the risks of continued smoking in regards to this patients health history was reiterated. Options of smoking cessation were also discussed. This time extended beyond the routine exam.      See Patient Instructions    Tyler Stone MD  [facility]    THE MEDICATION LIST HAS BEEN FULLY RECONCILED BY THE M.RAVEN. AND THE NURSING STAFF.

## 2017-05-08 NOTE — NURSING NOTE
"Chief Complaint   Patient presents with     Recheck Medication       Initial /80  Pulse 74  Temp 98.6  F (37  C) (Oral)  Wt 178 lb 3.2 oz (80.8 kg)  SpO2 94%  BMI 24.85 kg/m2 Estimated body mass index is 24.85 kg/(m^2) as calculated from the following:    Height as of 3/21/17: 5' 11\" (1.803 m).    Weight as of this encounter: 178 lb 3.2 oz (80.8 kg).  Medication Reconciliation: complete   Jemima Villagran CMA      "

## 2017-05-08 NOTE — MR AVS SNAPSHOT
After Visit Summary   5/8/2017    Colby Trent    MRN: [id number]           Patient Information     Date Of Birth          1960        Visit Information        Provider Department      5/8/2017 9:20 AM Tyler Stone MD [facility]        Today's Diagnoses     Acute deep vein thrombosis (DVT) of popliteal vein of right lower extremity (H)    -  1       Follow-ups after your visit        Your next 10 appointments already scheduled     May 08, 2017  9:20 AM CDT   [facility] with Tyler Stone MD   [facility]    [address] [id number] [phone]              Who to contact     If you have questions or need follow up information about today's clinic visit or your schedule please contact [facility] directly at [phone].  Normal or non-critical lab and imaging results will be communicated to you by MyChart, letter or phone within 4 business days after the clinic has received the results. If you do not hear from us within 7 days, please contact the clinic through MyChart or phone. If you have a critical or abnormal lab result, we will notify you by phone as soon as possible.  Submit refill requests through [email or web address] or call your pharmacy and they will forward the refill request to us. Please allow 3 business days for your refill to be completed.          Additional Information About Your Visit        MyChart Information     [email or web address] gives you secure access to your electronic health record. If you see a primary care provider, you can also send messages to your care team and make appointments. If you have questions, please call your primary care clinic.  If you do not have a primary care provider, please call [phone] and they will assist you.        Care EveryWhere ID     This is your Care EveryWhere ID. This could be used by other organizations to access  your [place] medical records  [id number]        Your Vitals Were     Pulse Temperature Pulse Oximetry BMI (Body Mass Index)          74 98.6  F (37  C) (Oral) 94% 24.85 kg/m2         Blood Pressure from Last 3 Encounters:   05/08/17 114/80   03/21/17 123/79   03/07/17 139/81    Weight from Last 3 Encounters:   05/08/17 178 lb 3.2 oz (80.8 kg)   03/21/17 180 lb (81.6 kg)   03/07/17 180 lb (81.6 kg)              Today, you had the following     No orders found for display       Primary Care Provider Office Phone # Fax #    Tyler Stone [phone]       [facility] [address] [place] [phone]        Thank you!     Thank you for choosing [facility]  for your care. Our goal is always to provide you with excellent care. Hearing back from our patients is one way we can continue to improve our services. Please take a few minutes to complete the written survey that you may receive in the mail after your visit with us. Thank you!             Your Updated Medication List - Protect others around you: Learn how to safely use, store and throw away your medicines at www.disposemymeds.org.          This list is accurate as of: 5/8/17  9:13 AM.  Always use your most recent med list.                   Brand Name Dispense Instructions for use    apixaban ANTICOAGULANT 5 MG tablet    ELIQUIS    180 tablet    5 mg (1 tab) 2 times daily.       lamoTRIgine 100 MG tablet    LaMICtal    270 tablet    Take 2 and 1/2 to 3 per day       MULTIVITAMIN/MINERALS PO          order for DME     1 Device    Equipment being ordered: Tall aircast boot       propranolol 20 MG tablet    INDERAL    60 tablet    Take 1-2 tablets as needed 1/2 hour before stressful events       risperiDONE 2 MG tablet    risperDAL    180 tablet    Take 1/2 to 1 twice a day       traZODone 50 MG tablet    DESYREL    90 tablet    Take 1-3 tablets at bedtime       VITAMIN D (CHOLECALCIFEROL) PO      Take  2,000 Units by mouth daily

## 2017-07-27 ENCOUNTER — OFFICE VISIT (OUTPATIENT)
Dept: URGENT CARE | Facility: URGENT CARE | Age: 57
End: 2017-07-27
Payer: COMMERCIAL

## 2017-07-27 VITALS
OXYGEN SATURATION: 100 % | HEART RATE: 68 BPM | TEMPERATURE: 97.5 F | WEIGHT: 168.6 LBS | BODY MASS INDEX: 23.51 KG/M2 | DIASTOLIC BLOOD PRESSURE: 79 MMHG | SYSTOLIC BLOOD PRESSURE: 129 MMHG

## 2017-07-27 DIAGNOSIS — H61.21 IMPACTED CERUMEN OF RIGHT EAR: Primary | ICD-10-CM

## 2017-07-27 PROCEDURE — 99213 OFFICE O/P EST LOW 20 MIN: CPT | Performed by: PHYSICIAN ASSISTANT

## 2017-07-27 NOTE — PROGRESS NOTES
SUBJECTIVE:  Colby Trent is a 56 year old male who presents with right ear blockage for 2 day(s).   Severity: moderate   Timing:still present  Additional symptoms include decreased hearing.      History of recurrent otitis: none    Past Medical History:   Diagnosis Date     Bipolar disorder (H)      Patient Active Problem List   Diagnosis     CARDIOVASCULAR SCREENING; LDL GOAL LESS THAN 130     Bipolar disorder (H)     Plantar fascial fibromatosis     Tobacco abuse     Abnormal results of liver function studies     Acute deep vein thrombosis (DVT) of popliteal vein of right lower extremity (H)     Right ankle pain     Fracture of fibula, distal, closed     Social History   Substance Use Topics     Smoking status: Current Every Day Smoker     Packs/day: 1.00     Types: Cigarettes     Smokeless tobacco: Never Used      Comment: 1/2 PPD     Alcohol use No       ROS:   CONSTITUTIONAL:NEGATIVE for fever, chills, change in weight  INTEGUMENTARY/SKIN: NEGATIVE for worrisome rashes, moles or lesions  ENT/MOUTH: POSITIVE for right ear blockage, decreased hearing  RESP:NEGATIVE for significant cough or SOB  CV: NEGATIVE for chest pain, palpitations or peripheral edema  NEURO: positive for decreased hearing    OBJECTIVE:  /79 (BP Location: Right arm, Patient Position: Chair, Cuff Size: Adult Regular)  Pulse 68  Temp 97.5  F (36.4  C) (Oral)  Wt 168 lb 9.6 oz (76.5 kg)  SpO2 100%  BMI 23.51 kg/m2   EXAM:  The right TM is normal: no effusions, no erythema, and normal landmarks  After irrigation  The right auditory canal is obstructed with cerumen  The left TM is normal: no effusions, no erythema, and normal landmarks  The left auditory canal is normal and without drainage, edema or erythema  Oropharynx exam is normal: no lesions, erythema, adenopathy or exudate.  GENERAL: no acute distress  EYES: EOMI,  PERRL, conjunctiva clear  NECK: supple, non-tender to palpation, no adenopathy noted  RESP: lungs clear to  auscultation - no rales, rhonchi or wheezes  CV: regular rates and rhythm, normal S1 S2, no murmur noted  SKIN: no suspicious lesions or rashes     ASSESSMENT/PLAN:      ICD-10-CM    1. Impacted cerumen of right ear H61.21        Follow up with PCP as needed  See orders in Epic

## 2017-07-27 NOTE — MR AVS SNAPSHOT
After Visit Summary   7/27/2017    Colby Trent    MRN: [id number]           Patient Information     Date Of Birth          1960        Visit Information        Provider Department      7/27/2017 9:20 AM Naun Sandhu PA-C [facility]        Today's Diagnoses     Impacted cerumen of right ear    -  1       Follow-ups after your visit        Who to contact     If you have questions or need follow up information about today's clinic visit or your schedule please contact [facility] directly at [phone].  Normal or non-critical lab and imaging results will be communicated to you by Headplayhart, letter or phone within 4 business days after the clinic has received the results. If you do not hear from us within 7 days, please contact the clinic through MessagePartyt or phone. If you have a critical or abnormal lab result, we will notify you by phone as soon as possible.  Submit refill requests through Mu Sigma or call your pharmacy and they will forward the refill request to us. Please allow 3 business days for your refill to be completed.          Additional Information About Your Visit        MyChart Information     Mu Sigma gives you secure access to your electronic health record. If you see a primary care provider, you can also send messages to your care team and make appointments. If you have questions, please call your primary care clinic.  If you do not have a primary care provider, please call [phone] and they will assist you.        Care EveryWhere ID     This is your Care EveryWhere ID. This could be used by other organizations to access your [place] medical records  [id number]        Your Vitals Were     Pulse Temperature Pulse Oximetry BMI (Body Mass Index)          68 97.5  F (36.4  C) (Oral) 100% 23.51 kg/m2         Blood Pressure from Last 3 Encounters:   07/27/17 129/79   05/08/17 114/80   03/21/17 123/79    Weight from  Last 3 Encounters:   07/27/17 168 lb 9.6 oz (76.5 kg)   05/08/17 178 lb 3.2 oz (80.8 kg)   03/21/17 180 lb (81.6 kg)              We Performed the Following     Nursing Communication 1        Primary Care Provider Office Phone # Fax #    Tyler Stone [phone]       [facility] [address] [facility] [address]        Equal Access to Services     TU GALICIA : Hadii aad ku hadasho Soomaali, waaxda luqadaha, qaybta kaalmada adeegyada, waxay idiin hayaan adeeg kharash la'altagracia martinez. So [facility] [phone].    ATENCIÓN: Si habla español, tiene a allison disposición servicios gratuitos de asistencia lingüística. Llame al [phone].    We comply with applicable federal civil rights laws and [place] laws. We do not discriminate on the basis of race, color, national origin, age, disability sex, sexual orientation or gender identity.            Thank you!     Thank you for choosing [facility]  for your care. Our goal is always to provide you with excellent care. Hearing back from our patients is one way we can continue to improve our services. Please take a few minutes to complete the written survey that you may receive in the mail after your visit with us. Thank you!             Your Updated Medication List - Protect others around you: Learn how to safely use, store and throw away your medicines at www.disposemymeds.org.          This list is accurate as of: 7/27/17 10:02 AM.  Always use your most recent med list.                   Brand Name Dispense Instructions for use Diagnosis    apixaban ANTICOAGULANT 5 MG tablet    ELIQUIS    180 tablet    5 mg (1 tab) 2 times daily.    Acute deep vein thrombosis (DVT) of popliteal vein of right lower extremity (H)       lamoTRIgine 100 MG tablet    LaMICtal    270 tablet    Take 2 and 1/2 to 3 per day    Bipolar disorder, unspecified (H)       MULTIVITAMIN/MINERALS PO           order for DME     1 Device    Equipment  being ordered: Tall aircast boot    Ankle fracture, right, closed, with routine healing, subsequent encounter       propranolol 20 MG tablet    INDERAL    60 tablet    Take 1-2 tablets as needed 1/2 hour before stressful events    Bipolar II disorder (H)       risperiDONE 2 MG tablet    risperDAL    180 tablet    Take 1/2 to 1 twice a day    Bipolar disorder, unspecified (H)       traZODone 50 MG tablet    DESYREL    90 tablet    Take 1-3 tablets at bedtime    Bipolar disorder, unspecified (H)       VITAMIN D (CHOLECALCIFEROL) PO      Take 2,000 Units by mouth daily

## 2017-07-27 NOTE — NURSING NOTE
"Chief Complaint   Patient presents with     Ear Problem     Rt ear feels plugged x few weeks        Initial /79 (BP Location: Right arm, Patient Position: Chair, Cuff Size: Adult Regular)  Pulse 68  Temp 97.5  F (36.4  C) (Oral)  Wt 168 lb 9.6 oz (76.5 kg)  SpO2 100%  BMI 23.51 kg/m2 Estimated body mass index is 23.51 kg/(m^2) as calculated from the following:    Height as of 3/21/17: 5' 11\" (1.803 m).    Weight as of this encounter: 168 lb 9.6 oz (76.5 kg).  Medication Reconciliation: complete    "

## 2017-08-31 ENCOUNTER — TELEPHONE (OUTPATIENT)
Dept: PODIATRY | Facility: CLINIC | Age: 57
End: 2017-08-31

## 2017-08-31 NOTE — TELEPHONE ENCOUNTER
Reason for Call:  Other Disability Forms    Detailed comments: [facility] called because they need the forms they sent over to be filled out and sent back   Addressing this patients disability  Forms were scanned into chart 8/11 but sent to us in July    Phone Number Patient can be reached at: [place]  [phone]    Best Time: anytime    Can we leave a detailed message on this number? YES    Call taken on 8/31/2017 at 11:47 AM by Arlette Clemens

## 2017-08-31 NOTE — TELEPHONE ENCOUNTER
Left VM carisa Alvarado regarding clarification of request, please fax request to [phone], if new forms - they have not been received.

## 2017-09-08 ENCOUNTER — OFFICE VISIT (OUTPATIENT)
Dept: PSYCHIATRY | Facility: CLINIC | Age: 57
End: 2017-09-08
Payer: COMMERCIAL

## 2017-09-08 DIAGNOSIS — F31.9 BIPOLAR DISORDER, UNSPECIFIED (H): ICD-10-CM

## 2017-09-08 PROCEDURE — 99214 OFFICE O/P EST MOD 30 MIN: CPT | Performed by: PSYCHIATRY & NEUROLOGY

## 2017-09-08 RX ORDER — LAMOTRIGINE 200 MG/1
TABLET ORAL
Qty: 90 TABLET | Refills: 2 | Status: SHIPPED | OUTPATIENT
Start: 2017-09-08 | End: 2018-03-15

## 2017-09-08 ASSESSMENT — PATIENT HEALTH QUESTIONNAIRE - PHQ9
SUM OF ALL RESPONSES TO PHQ QUESTIONS 1-9: 0
5. POOR APPETITE OR OVEREATING: NOT AT ALL

## 2017-09-08 ASSESSMENT — ANXIETY QUESTIONNAIRES
2. NOT BEING ABLE TO STOP OR CONTROL WORRYING: NOT AT ALL
5. BEING SO RESTLESS THAT IT IS HARD TO SIT STILL: NOT AT ALL
GAD7 TOTAL SCORE: 0
3. WORRYING TOO MUCH ABOUT DIFFERENT THINGS: NOT AT ALL
1. FEELING NERVOUS, ANXIOUS, OR ON EDGE: NOT AT ALL
7. FEELING AFRAID AS IF SOMETHING AWFUL MIGHT HAPPEN: NOT AT ALL
6. BECOMING EASILY ANNOYED OR IRRITABLE: NOT AT ALL

## 2017-09-08 NOTE — MR AVS SNAPSHOT
MRN:[id number]                      After Visit Summary   9/8/2017    Colby Trent    MRN: [id number]           Visit Information        Provider Department      9/8/2017 8:00 AM Kenji Nguyen MD [facility]        Care Instructions      Treatment Plan:  Continue Lamictal (lamotrigine) 200 mg per day  Continue with vitamin D   Call for a letter for DOT as needed; no limitations for work   Safety plan was reviewed; to the ER as needed or call after hours crisis line; [phone]  Education and counseling was done regarding use of medications, psychotherapy options  Call for a follow up appointment with Dr. Nguyen in less than a year; [phone].         Filmmortalhart Information     GetMyRx gives you secure access to your electronic health record. If you see a primary care provider, you can also send messages to your care team and make appointments. If you have questions, please call your primary care clinic.  If you do not have a primary care provider, please call [phone] and they will assist you.        Care EveryWhere ID     This is your Care EveryWhere ID. This could be used by other organizations to access your [place] medical records  [id number]        Equal Access to Services     TU GALICIA : Deepali Pineda, amanda nash, lu hancock, gladys martinez. So [facility] [phone].    ATENCIÓN: Si habla español, tiene a allison disposición servicios gratuitos de asistencia lingüística. Llame al [phone].    We comply with applicable federal civil rights laws and [place] laws. We do not discriminate on the basis of race, color, national origin, age, disability sex, sexual orientation or gender identity.

## 2017-09-08 NOTE — PATIENT INSTRUCTIONS
Treatment Plan:  Continue Lamictal (lamotrigine) 200 mg per day  Continue with vitamin D   Call for a letter for DOT as needed; no limitations for work   Safety plan was reviewed; to the ER as needed or call after hours crisis line; [phone]  Education and counseling was done regarding use of medications, psychotherapy options  Call for a follow up appointment with Dr. Nguyen in less than a year; [phone].

## 2017-09-08 NOTE — PROGRESS NOTES
Psychiatric Progress Note    Name: Colby Trent  Date: September 8, 2017   Length of Visit: 40 minutes    MRN: [id number]      Current Outpatient Prescriptions   Medication Sig     lamoTRIgine (LAMICTAL) 200 MG tablet Take 1 per day     [DISCONTINUED] lamoTRIgine (LAMICTAL) 100 MG tablet Take 2 and 1/2 to 3 per day     VITAMIN D, CHOLECALCIFEROL, PO Take 2,000 Units by mouth daily     Hmxolz-NuEci-UcJjkn-FA-Omega (MULTIVITAMIN/MINERALS PO)      No current facility-administered medications for this visit.    plus vitamin D 2,000 IU     Therapist:  None; no interest     PHQ-9:  PHQ-9 SCORE 12/17/2015 8/18/2016 9/8/2017   Total Score - - -   Total Score 17 0 0      JOSE-7:  JOSE-7 SCORE 12/17/2015 8/18/2016 9/8/2017   Total Score 15 1 0   Total Score - - -        Interim History:  He is very happy with driving (finished school last fall). Now driving locally rather than over the road; is very happy with this, so less need for meds.   Feels stable on current meds; was able to go off Risperdal about a year ago. 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   200 mg of Lamictal works well and would like to stay on this.   Living situation is stable with GF and his 26 y/o son, and father who was just diagnosed wit dementia.     Assessment: from last visit 8/18/16  Doing fine on current RX and it seems reasonable to stay off work until Sept 6. Letter written.   Has performance anxiety; will try Inderal.     Treatment Plan:  Continue Lamictal (lamotrigine) 200 to 300 mg per day  OK to use Risperdal (risperidone) as needed   Trial of Inderal (propranolol) 20 mg; 1-2 a half hour before stressful events.   OK to stay off work until Sept 6th.   Continue with vitamin D   Safety plan was reviewed; to the ER as needed or call after hours crisis line; [phone]  Education and counseling was done regarding use of medications, psychotherapy options  Follow up with Tyler Stone for medication  "management and refills.     Past Medical History:   Diagnosis Date     Bipolar disorder (H)      10 point ROS is reviewed and is negative     Mental Status Assessment:  Appearance:  Well groomed     Behavior/relationship to examiner/demeanor:  Cooperative, engaged   Motor activity:  Calm    Gait:  Normal   Speech:  Normal in volume, articulation, coherence   Mood (subjective report):  \"mellow\"   Affect (objective appearance):  Mood congruent  Thought Process (Associations):  Logical, linear and goal directed  Thought content:  No evidence of suicidal or homicidal ideation,          no overt psychosis and                    patient does not appear to be responding to internal stimuli  Oriented to person, place, date/time   Attention Span and concentration: Intact   Memory:  Short-term memory intact and Long-term memory; Intact  Language:  Fluent   Fund of Knowledge/Intelligence:  Average  Use of language: Intact   Abstraction:  Normal  Insight:  Adequate  Judgment:  Adequate for safety    DSM-5 DIAGNOSIS:  F 31.30- Bipolar II     300.02 - Generalized Anxiety Disorder    314.01 - Attention Deficit With Hyperactivity Rule Out  303.90 - Alcohol Dependence By History  304.30 - Cannabis use disorder, in remission     Assessment:  He is stable, but he needs to stay on Lamictal; 200 mg should be sufficient.   Is 100 % improved since initial visit.   No limitations regarding his driving.     Treatment Plan:  Continue Lamictal (lamotrigine) 200 mg per day  Continue with vitamin D   Call for a letter for DOT as needed; no limitations for work   Safety plan was reviewed; to the ER as needed or call after hours crisis line; [phone]  Education and counseling was done regarding use of medications, psychotherapy options  Call for a follow up appointment with Dr. Nguyen in less than a year; [phone].     Signed: Kenji Nguyen M.D.     "

## 2017-09-09 ASSESSMENT — ANXIETY QUESTIONNAIRES: GAD7 TOTAL SCORE: 0

## 2017-09-22 ENCOUNTER — OFFICE VISIT (OUTPATIENT)
Dept: URGENT CARE | Facility: URGENT CARE | Age: 57
End: 2017-09-22
Payer: COMMERCIAL

## 2017-09-22 VITALS
OXYGEN SATURATION: 94 % | HEART RATE: 76 BPM | BODY MASS INDEX: 22.55 KG/M2 | SYSTOLIC BLOOD PRESSURE: 129 MMHG | DIASTOLIC BLOOD PRESSURE: 85 MMHG | WEIGHT: 161.7 LBS | TEMPERATURE: 98.3 F

## 2017-09-22 DIAGNOSIS — R05.8 PRODUCTIVE COUGH: Primary | ICD-10-CM

## 2017-09-22 DIAGNOSIS — Z72.0 TOBACCO ABUSE: ICD-10-CM

## 2017-09-22 PROCEDURE — 99213 OFFICE O/P EST LOW 20 MIN: CPT | Performed by: FAMILY MEDICINE

## 2017-09-22 RX ORDER — AZITHROMYCIN 250 MG/1
TABLET, FILM COATED ORAL
Qty: 6 TABLET | Refills: 0 | Status: SHIPPED | OUTPATIENT
Start: 2017-09-22 | End: 2017-09-27

## 2017-09-22 NOTE — MR AVS SNAPSHOT
After Visit Summary   9/22/2017    Colby Trent    MRN: [id number]           Patient Information     Date Of Birth          1960        Visit Information        Provider Department      9/22/2017 9:05 AM Tyler Dickens, DO [facility] [place]        Today's Diagnoses     Productive cough    -  1    Tobacco abuse           Follow-ups after your visit        Who to contact     If you have questions or need follow up information about today's clinic visit or your schedule please contact [facility] directly at [phone].  Normal or non-critical lab and imaging results will be communicated to you by Akeneohart, letter or phone within 4 business days after the clinic has received the results. If you do not hear from us within 7 days, please contact the clinic through Ipercastt or phone. If you have a critical or abnormal lab result, we will notify you by phone as soon as possible.  Submit refill requests through TV Compass or call your pharmacy and they will forward the refill request to us. Please allow 3 business days for your refill to be completed.          Additional Information About Your Visit        MyChart Information     TV Compass gives you secure access to your electronic health record. If you see a primary care provider, you can also send messages to your care team and make appointments. If you have questions, please call your primary care clinic.  If you do not have a primary care provider, please call [phone] and they will assist you.        Care EveryWhere ID     This is your Care EveryWhere ID. This could be used by other organizations to access your [place] medical records  [id number]        Your Vitals Were     Pulse Temperature Pulse Oximetry BMI (Body Mass Index)          76 98.3  F (36.8  C) (Oral) 94% 22.55 kg/m2         Blood Pressure from Last 3 Encounters:   09/22/17 129/85   07/27/17 129/79   05/08/17 114/80    Weight  from Last 3 Encounters:   09/22/17 161 lb 11.2 oz (73.3 kg)   07/27/17 168 lb 9.6 oz (76.5 kg)   05/08/17 178 lb 3.2 oz (80.8 kg)              Today, you had the following     No orders found for display         Today's Medication Changes          These changes are accurate as of: 9/22/17  9:39 AM.  If you have any questions, ask your nurse or doctor.               Start taking these medicines.        Dose/Directions    azithromycin 250 MG tablet   Commonly known as:  ZITHROMAX   Used for:  Productive cough   Started by:  Tyler Dickens, DO        Two tablets first day, then one tablet daily for four days.   Quantity:  6 tablet   Refills:  0            Where to get your medicines      These medications were sent to [facility]/pharmacy #[id number] - [place] [id number] [address]     Phone:  [phone]     azithromycin 250 MG tablet                Primary Care Provider Office Phone # Fax #    Tyler Stone [phone]       [address] [place] [address]        Equal Access to Services     [facility] HospitalLISSA : Hadii aad ku hadasho Soomaali, waaxda luqadaha, qaybta kaalmada adeegyada, gladys keita hayaltagracia reyes . So [facility] [phone].    ATENCIÓN: Si habla español, tiene a allison disposición servicios gratuitos de asistencia lingüística. LlAvita [facility] [phone].    We comply with applicable federal civil rights laws and [place] laws. We do not discriminate on the basis of race, color, national origin, age, disability sex, sexual orientation or gender identity.            Thank you!     Thank you for choosing [facility]  for your care. Our goal is always to provide you with excellent care. Hearing back from our patients is one way we can continue to improve our services. Please take a few minutes to complete the written survey that you may receive in the mail after your visit with us. Thank you!             Your  Updated Medication List - Protect others around you: Learn how to safely use, store and throw away your medicines at www.disposemymeds.org.          This list is accurate as of: 9/22/17  9:39 AM.  Always use your most recent med list.                   Brand Name Dispense Instructions for use Diagnosis    azithromycin 250 MG tablet    ZITHROMAX    6 tablet    Two tablets first day, then one tablet daily for four days.    Productive cough       lamoTRIgine 200 MG tablet    LaMICtal    90 tablet    Take 1 per day    Bipolar disorder, unspecified (H)       MULTIVITAMIN/MINERALS PO           VITAMIN D (CHOLECALCIFEROL) PO      Take 2,000 Units by mouth daily

## 2017-09-22 NOTE — NURSING NOTE
"Chief Complaint   Patient presents with     Urgent Care     pt states coughing, gets fever on and off, fatigue 8x days        Initial /85  Pulse 76  Temp 98.3  F (36.8  C) (Oral)  Wt 161 lb 11.2 oz (73.3 kg)  SpO2 94%  BMI 22.55 kg/m2 Estimated body mass index is 22.55 kg/(m^2) as calculated from the following:    Height as of 3/21/17: 5' 11\" (1.803 m).    Weight as of this encounter: 161 lb 11.2 oz (73.3 kg).  Medication Reconciliation: complete      "

## 2018-02-01 ENCOUNTER — OFFICE VISIT (OUTPATIENT)
Dept: URGENT CARE | Facility: URGENT CARE | Age: 58
End: 2018-02-01
Payer: COMMERCIAL

## 2018-02-01 VITALS
DIASTOLIC BLOOD PRESSURE: 70 MMHG | WEIGHT: 158 LBS | BODY MASS INDEX: 22.04 KG/M2 | TEMPERATURE: 97.4 F | RESPIRATION RATE: 20 BRPM | HEART RATE: 68 BPM | SYSTOLIC BLOOD PRESSURE: 123 MMHG

## 2018-02-01 DIAGNOSIS — H61.21 IMPACTED CERUMEN OF RIGHT EAR: Primary | ICD-10-CM

## 2018-02-01 PROCEDURE — 99213 OFFICE O/P EST LOW 20 MIN: CPT | Performed by: FAMILY MEDICINE

## 2018-02-01 NOTE — MR AVS SNAPSHOT
After Visit Summary   2/1/2018    Colby Trent    MRN: [id number]           Patient Information     Date Of Birth          1960        Visit Information        Provider Department      2/1/2018 9:25 AM Tyler Dickens,  [facility]        Today's Diagnoses     Impacted cerumen of right ear    -  1       Follow-ups after your visit        Your next 10 appointments already scheduled     Mar 15, 2018  8:00 AM CDT   Return Visit with Kenji Nguyen MD   [facility]    [address] [facility] [address] [phone]              Who to contact     If you have questions or need follow up information about today's clinic visit or your schedule please contact [facility] directly at [phone].  Normal or non-critical lab and imaging results will be communicated to you by MyChart, letter or phone within 4 business days after the clinic has received the results. If you do not hear from us within 7 days, please contact the clinic through MyChart or phone. If you have a critical or abnormal lab result, we will notify you by phone as soon as possible.  Submit refill requests through Premium Advert Solutions or call your pharmacy and they will forward the refill request to us. Please allow 3 business days for your refill to be completed.          Additional Information About Your Visit        MyChart Information     [employer] Solutions gives you secure access to your electronic health record. If you see a primary care provider, you can also send messages to your care team and make appointments. If you have questions, please call your primary care clinic.  If you do not have a primary care provider, please call [phone] and they will assist you.        Care EveryWhere ID     This is your Care EveryWhere ID. This could be used by other organizations to access your [facility]  records  [id number]        Your Vitals Were     Pulse Temperature Respirations BMI (Body Mass Index)          68 97.4  F (36.3  C) (Oral) 20 22.04 kg/m2         Blood Pressure from Last 3 Encounters:   02/01/18 123/70   09/22/17 129/85   07/27/17 129/79    Weight from Last 3 Encounters:   02/01/18 158 lb (71.7 kg)   09/22/17 161 lb 11.2 oz (73.3 kg)   07/27/17 168 lb 9.6 oz (76.5 kg)              We Performed the Following     Nursing Communication 1        Primary Care Provider Office Phone # Fax #    Tyler Stone [phone]       [address] [facility] [address]        Equal Access to Services     TU GALICIA : Hadii mervin felix hadasho Soomaali, waaxda luqadaha, qaybta kaalmada adeegyada, waxay rexin hayaltagracia reyes . So [facility] [phone].    ATENCIÓN: Si habla español, tiene a allison disposición servicios gratuitos de asistencia lingüística. Llame al [phone].    We comply with applicable federal civil rights laws and [place] laws. We do not discriminate on the basis of race, color, national origin, age, disability, sex, sexual orientation, or gender identity.            Thank you!     Thank you for choosing [facility]  for your care. Our goal is always to provide you with excellent care. Hearing back from our patients is one way we can continue to improve our services. Please take a few minutes to complete the written survey that you may receive in the mail after your visit with us. Thank you!             Your Updated Medication List - Protect others around you: Learn how to safely use, store and throw away your medicines at www.disposemymeds.org.          This list is accurate as of 2/1/18 10:48 AM.  Always use your most recent med list.                   Brand Name Dispense Instructions for use Diagnosis    lamoTRIgine 200 MG tablet    LaMICtal    90 tablet    Take 1 per day    Bipolar disorder, unspecified       MULTIVITAMIN/MINERALS PO            VITAMIN D (CHOLECALCIFEROL) PO      Take 2,000 Units by mouth daily

## 2018-02-01 NOTE — PROGRESS NOTES
SUBJECTIVE:Colby Trent is a 57 year old male who presents with right ear waxfor day(s).   Severity: mild and moderate   Timing:still present  Additional symptoms include none.    History of recurrent otitis: no    Past Medical History:   Diagnosis Date     Bipolar disorder (H)      Allergies   Allergen Reactions     No Known Drug Allergies      Social History   Substance Use Topics     Smoking status: Current Every Day Smoker     Packs/day: 1.00     Types: Cigarettes     Smokeless tobacco: Never Used      Comment: 1/2 PPD     Alcohol use No       ROS: CONSTITUTIONAL:NEGATIVE for fever, chills, change in weight    OBJECTIVE:  /70  Pulse 68  Temp 97.4  F (36.3  C) (Oral)  Resp 20  Wt 158 lb (71.7 kg)  BMI 22.04 kg/m2   The right TM is not visualized secondary to cerumen     The right auditory canal is obstructed with cerumen  The left TM is normal: no effusions, no erythema, and normal landmarks  The left auditory canal is normal and without drainage, edema or erythema  Oropharynx exam is normal: no lesions, erythema, adenopathy or exudate.GENERAL: no acute distress  EYES: EOMI,  PERRL, conjunctiva clear  NECK: supple, non-tender to palpation, no adenopathy noted  SKIN: no suspicious lesions or rashes       ICD-10-CM    1. Impacted cerumen of right ear H61.21 Nursing Communication 1       F/U PCP/IM/FP/UC if worse, not any better

## 2018-03-15 ENCOUNTER — OFFICE VISIT (OUTPATIENT)
Dept: PSYCHIATRY | Facility: CLINIC | Age: 58
End: 2018-03-15
Payer: COMMERCIAL

## 2018-03-15 ENCOUNTER — TELEPHONE (OUTPATIENT)
Dept: PHARMACY | Facility: OTHER | Age: 58
End: 2018-03-15

## 2018-03-15 DIAGNOSIS — F31.81 BIPOLAR II DISORDER (H): Primary | ICD-10-CM

## 2018-03-15 PROCEDURE — 99214 OFFICE O/P EST MOD 30 MIN: CPT | Performed by: PSYCHIATRY & NEUROLOGY

## 2018-03-15 RX ORDER — LAMOTRIGINE 200 MG/1
TABLET ORAL
Qty: 90 TABLET | Refills: 3 | Status: SHIPPED | OUTPATIENT
Start: 2018-03-15 | End: 2020-01-22

## 2018-03-15 ASSESSMENT — PATIENT HEALTH QUESTIONNAIRE - PHQ9
5. POOR APPETITE OR OVEREATING: SEVERAL DAYS
5. POOR APPETITE OR OVEREATING: SEVERAL DAYS

## 2018-03-15 ASSESSMENT — ANXIETY QUESTIONNAIRES
2. NOT BEING ABLE TO STOP OR CONTROL WORRYING: SEVERAL DAYS
3. WORRYING TOO MUCH ABOUT DIFFERENT THINGS: NOT AT ALL
1. FEELING NERVOUS, ANXIOUS, OR ON EDGE: NOT AT ALL
7. FEELING AFRAID AS IF SOMETHING AWFUL MIGHT HAPPEN: NOT AT ALL
1. FEELING NERVOUS, ANXIOUS, OR ON EDGE: NOT AT ALL
5. BEING SO RESTLESS THAT IT IS HARD TO SIT STILL: NOT AT ALL
6. BECOMING EASILY ANNOYED OR IRRITABLE: NOT AT ALL
2. NOT BEING ABLE TO STOP OR CONTROL WORRYING: SEVERAL DAYS
GAD7 TOTAL SCORE: 2
6. BECOMING EASILY ANNOYED OR IRRITABLE: NOT AT ALL
7. FEELING AFRAID AS IF SOMETHING AWFUL MIGHT HAPPEN: NOT AT ALL
5. BEING SO RESTLESS THAT IT IS HARD TO SIT STILL: NOT AT ALL
GAD7 TOTAL SCORE: 2
3. WORRYING TOO MUCH ABOUT DIFFERENT THINGS: NOT AT ALL

## 2018-03-15 NOTE — TELEPHONE ENCOUNTER
MTM referral from: [place] clinic visit (referral by provider)    MTM referral outreach attempt #1 on March 15, 2018 at 1:24 PM      Outcome: Left Message    Marcellus Hernandez MTM Coordinator

## 2018-03-15 NOTE — PROGRESS NOTES
Psychiatric Progress Note    Name: Colby Trent  Date: March 15, 2018   Length of Visit: 40 minutes    MRN: [id number]      Current Outpatient Prescriptions   Medication Sig     lamoTRIgine (LAMICTAL) 200 MG tablet Take 1 per day     VITAMIN D, CHOLECALCIFEROL, PO Take 2,000 Units by mouth daily     Iirnkl-WaSrn-IcLgch-FA-Omega (MULTIVITAMIN/MINERALS PO)      No current facility-administered medications for this visit.    plus vitamin D 2,000 IU   Past medication trials: (patient was presented with a list to review all currently available antidepressants, mood stabilizers, tranquilizers, hypnotics and antipsychotics)  New Antidepressants:  Wellbutrin, Zyban, Aplenzin (bupropion)  Mood Stabilizers:  Lithium Carbonate  Newer Antipsychotics:  Seroquel (quetiapine)  Tranquilizers:  Buspar (buspirone)  chantix.   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 Sometimes gets quite anxious, maybe becomes a bit manic. Was using PRN Risperdal in 2015 when feeling manic, Hasn't used this in several years.   He does not think that he needs any medication for the anxiety since it tends to go away on its own and sounds like he is using some CBT techniques.    Assessment: from last visit 9/8/17  He is stable, but he needs to stay on Lamictal; 200 mg should be sufficient.   Is 100 % improved since initial visit.   No limitations regarding his driving.     Treatment Plan:  Continue Lamictal (lamotrigine) 200 mg per day  Continue with vitamin D   Call for a letter for DOT as needed; no limitations for work   Safety plan was reviewed; to the ER as needed or call after hours crisis  "line; [phone]  Education and counseling was done regarding use of medications, psychotherapy options  Call for a follow up appointment with Dr. Nguyen in less than a year; [phone].      Past Medical History:   Diagnosis Date     Bipolar disorder (H)      10 point ROS is reviewed and is negative     Mental Status Assessment:  Appearance:  Well groomed     Behavior/relationship to examiner/demeanor:  Cooperative, engaged   Motor activity:  Calm    Gait:  Normal   Speech:  Normal in volume, articulation, coherence   Mood (subjective report):  \"mellow\"   Affect (objective appearance):  Mood congruent  Thought Process (Associations):  Logical, linear and goal directed  Thought content:  No evidence of suicidal or homicidal ideation,          no overt psychosis and                    patient does not appear to be responding to internal stimuli  Oriented to person, place, date/time   Attention Span and concentration: Intact   Memory:  Short-term memory intact and Long-term memory; Intact  Language:  Fluent   Fund of Knowledge/Intelligence:  Average  Use of language: Intact   Abstraction:  Normal  Insight:  Adequate  Judgment:  Adequate for safety    DSM-5 DIAGNOSIS:  F 31.30- Bipolar II     300.02 - Generalized Anxiety Disorder    314.01 - Attention Deficit With Hyperactivity Rule Out  303.90 - Alcohol Dependence By History  304.30 - Cannabis use disorder, in remission     Assessment:  He is very stable on the Lamictal but he tells me that his PCP is hesitant to take over the psychiatric medications.  I will get him set up with the pharmacist for an MTM so he can identify them as a resource case he needs adjustment in his psychiatric medications (which I think is unlikely).  He has some Risperdal at home that he can use in the unlikely event that he starts developing some hypomanic symptoms.    Treatment Plan:  Continue Lamictal (lamotrigine) 200 mg per day  Risperdal (risperidone) 0.5 mg; 1-2 at bedtime if " feeling manic (use for a week or two)   Continue with vitamin D   A referral has been made for a consultation with a pharmacist to review medications. They should be calling you soon to schedule a visit. Their phone # is [phone] in case you need to reach them.   Safety plan was reviewed; to the ER as needed or call after hours crisis line; [phone]  Education and counseling was done regarding use of medications, psychotherapy options  Follow up with [facility] for medication management and refills.         Signed: Kenji Nguyen M.D.

## 2018-03-15 NOTE — MR AVS SNAPSHOT
MRN:[id number]                      After Visit Summary   3/15/2018    Colby Trent    MRN: [id number]           Visit Information        Provider Department      3/15/2018 8:00 AM Kenji Nguyen MD [facility]        Care Instructions        Treatment Plan:  Continue Lamictal (lamotrigine) 200 mg per day  Risperdal (risperidone) 0.5 mg; 1-2 at bedtime if feeling manic (use for a week or two)   Continue with vitamin D   A referral has been made for a consultation with a pharmacist to review medications. They should be calling you soon to schedule a visit. Their phone # is [phone] in case you need to reach them.   Safety plan was reviewed; to the ER as needed or call after hours crisis line; [phone]  Education and counseling was done regarding use of medications, psychotherapy options  Follow up with [facility] for medication management and refills.                [employer] Information     [employer] gives you secure access to your electronic health record. If you see a primary care provider, you can also send messages to your care team and make appointments. If you have questions, please call your primary care clinic.  If you do not have a primary care provider, please call [phone] and they will assist you.        Care EveryWhere ID     This is your Care EveryWhere ID. This could be used by other organizations to access your [facility] medical records  [id number]        Equal Access to Services     TU GALICIA : Deepali Pineda, waaxda luqadaha, qaybta kaalmada karissa, gladys martinez. So [facility] [phone].    ATENCIÓN: Si habla español, tiene a allison disposición servicios gratuitos de asistencia lingüística. Llame al [phone].    We comply with applicable federal civil rights laws and [place] laws. We do not discriminate on the basis of race, color, national origin, age, disability, sex, sexual  orientation, or gender identity.

## 2018-03-15 NOTE — PATIENT INSTRUCTIONS
Treatment Plan:  Continue Lamictal (lamotrigine) 200 mg per day  Risperdal (risperidone) 0.5 mg; 1-2 at bedtime if feeling manic (use for a week or two)   Continue with vitamin D   A referral has been made for a consultation with a pharmacist to review medications. They should be calling you soon to schedule a visit. Their phone # is [phone] in case you need to reach them.   Safety plan was reviewed; to the ER as needed or call after hours crisis line; [phone]  Education and counseling was done regarding use of medications, psychotherapy options  Follow up with [facility] for medication management and refills.

## 2018-03-16 ASSESSMENT — ANXIETY QUESTIONNAIRES: GAD7 TOTAL SCORE: 2

## 2018-03-16 ASSESSMENT — PATIENT HEALTH QUESTIONNAIRE - PHQ9: SUM OF ALL RESPONSES TO PHQ QUESTIONS 1-9: 2

## 2018-04-04 ENCOUNTER — OFFICE VISIT (OUTPATIENT)
Dept: PHARMACY | Facility: CLINIC | Age: 58
End: 2018-04-04
Payer: COMMERCIAL

## 2018-04-04 DIAGNOSIS — F31.9 BIPOLAR DISORDER (H): Primary | ICD-10-CM

## 2018-04-04 PROCEDURE — 99605 MTMS BY PHARM NP 15 MIN: CPT | Performed by: PHARMACIST

## 2018-04-04 PROCEDURE — 99607 MTMS BY PHARM ADDL 15 MIN: CPT | Performed by: PHARMACIST

## 2018-04-04 ASSESSMENT — ANXIETY QUESTIONNAIRES
3. WORRYING TOO MUCH ABOUT DIFFERENT THINGS: NOT AT ALL
1. FEELING NERVOUS, ANXIOUS, OR ON EDGE: SEVERAL DAYS
5. BEING SO RESTLESS THAT IT IS HARD TO SIT STILL: NOT AT ALL
7. FEELING AFRAID AS IF SOMETHING AWFUL MIGHT HAPPEN: NOT AT ALL
GAD7 TOTAL SCORE: 1
2. NOT BEING ABLE TO STOP OR CONTROL WORRYING: NOT AT ALL
IF YOU CHECKED OFF ANY PROBLEMS ON THIS QUESTIONNAIRE, HOW DIFFICULT HAVE THESE PROBLEMS MADE IT FOR YOU TO DO YOUR WORK, TAKE CARE OF THINGS AT HOME, OR GET ALONG WITH OTHER PEOPLE: NOT DIFFICULT AT ALL
6. BECOMING EASILY ANNOYED OR IRRITABLE: NOT AT ALL

## 2018-04-04 ASSESSMENT — PATIENT HEALTH QUESTIONNAIRE - PHQ9: 5. POOR APPETITE OR OVEREATING: NOT AT ALL

## 2018-04-04 NOTE — MR AVS SNAPSHOT
After Visit Summary   4/4/2018    Colby Trent    MRN: [id number]           Patient Information     Date Of Birth          1960        Visit Information        Provider Department      4/4/2018 8:00 AM Andra Brumfield, [facility]        Today's Diagnoses     Bipolar disorder (H)    -  1      Care Instructions    Recommendations from today's MTM visit:                                                    MTM (medication therapy management) is a service provided by a clinical pharmacist designed to help you get the most of out of your medicines.     No medication changes today. Continue Lamictal 200mg daily.     Next MTM visit: 6 months    To schedule another MTM appointment, please call the clinic directly or you may call the MTM scheduling line at [phone] or toll-free at [phone].     My Clinical Pharmacist's contact information:                                                      It was a pleasure seeing you today!  Please feel free to contact me with any questions or concerns you have.      Andra Brumfield, PharmD, [facility]  Medication Therapy Management Pharmacist  [facility]  [phone]    You may receive a survey about the MTM services you received.  I would appreciate your feedback to help me serve you better in the future. Please fill it out and return it when you can. Your comments will be anonymous.                    Follow-ups after your visit        Who to contact     If you have questions or need follow up information about today's clinic visit or your schedule please contact [facility] directly at [phone].  Normal or non-critical lab and imaging results will be communicated to you by MyChart, letter or phone within 4 business days after the clinic has received the results. If you do not hear from us within 7 days, please contact the clinic through [employer]  or phone. If you have a critical or abnormal lab result, we will notify you by phone as soon as possible.  Submit refill requests through [employer] or call your pharmacy and they will forward the refill request to us. Please allow 3 business days for your refill to be completed.          Additional Information About Your Visit        [employer] Information     [employer] gives you secure access to your electronic health record. If you see a primary care provider, you can also send messages to your care team and make appointments. If you have questions, please call your primary care clinic.  If you do not have a primary care provider, please call [phone] and they will assist you.        Care EveryWhere ID     This is your Care EveryWhere ID. This could be used by other organizations to access your [place] medical records  [id number]         Blood Pressure from Last 3 Encounters:   02/01/18 123/70   09/22/17 129/85   07/27/17 129/79    Weight from Last 3 Encounters:   02/01/18 158 lb (71.7 kg)   09/22/17 161 lb 11.2 oz (73.3 kg)   07/27/17 168 lb 9.6 oz (76.5 kg)              Today, you had the following     No orders found for display       Primary Care Provider Office Phone # Fax #    Tyler Stone [phone]       [address] [place] [id number]        Equal Access to Services     TU GALICIA : Hadii aad ku hadasho Soomaali, waaxda luqadaha, qaybta kaalmada adeegyada, gladys martniez. So [facility] [phone].    ATENCIÓN: Si habla español, tiene a allison disposición servicios gratuitos de asistencia lingüística. Jacque al [phone].    We comply with applicable federal civil rights laws and [place] laws. We do not discriminate on the basis of race, color, national origin, age, disability, sex, sexual orientation, or gender identity.            Thank you!     Thank you for choosing [facility]  for your care. Our goal is always to  provide you with excellent care. Hearing back from our patients is one way we can continue to improve our services. Please take a few minutes to complete the written survey that you may receive in the mail after your visit with us. Thank you!             Your Updated Medication List - Protect others around you: Learn how to safely use, store and throw away your medicines at www.disposemymeds.org.          This list is accurate as of 4/4/18  9:12 AM.  Always use your most recent med list.                   Brand Name Dispense Instructions for use Diagnosis    lamoTRIgine 200 MG tablet    LaMICtal    90 tablet    Take 1 per day    Bipolar II disorder (H)       MULTIVITAMIN/MINERALS PO           VITAMIN D (CHOLECALCIFEROL) PO      Take 2,000 Units by mouth daily

## 2018-04-04 NOTE — PROGRESS NOTES
"SUBJECTIVE/OBJECTIVE:                           Colby Trent is a 57 year old male coming in for an initial visit for Medication Therapy Management.  He was referred to me from Dr. Nguyen.     Chief Complaint: medication review- transition from Dr. Nguyen.    Allergies/ADRs: Reviewed in Epic  Tobacco: 0-1 pack per day - is not interested in quitting at this time.   Alcohol: none  Caffeine: did not discuss  PMH: Reviewed in [facility]  PCP: currently Dr. Tyler Stone, but will be switching    Medication Adherence/Access:no issues reported    Bipolar II: Current therapy includes: Lamictal 200mg daily. Pt has been seen by Dr. Nguyen for psychiatry care since 2014 as part of [facility]. Dr. Nguyen left outpatient practice as of 03/2018 and pt is here today for medication follow-up per recommendation by Dr. Nguyen. Per Dr. Nguyen's last assessment on 3/15/18, pt remains stable and medication adjustment appeared unlikely. Dr. Nguyen recommended MTM resource and that pt can follow-up with PCP for ongoing medication management.      Today pt reports he continues to feel very stable on his current medication regimen and that he has been stable for quite some time. His last manic episode was several years ago, during which time he believes he was inadvertently taking his medication incorrectly and ended up being hospitalized.  He denies current manic/hypomanic symptoms and depression symptoms. He denies medication side effects. He would like to use MTM services as a back up plan in the event his mood decompensates in the future. Plans to establish with a new PCP at [facility].     PHQ-9 SCORE 9/8/2017 3/15/2018 4/4/2018   Total Score - - -   Total Score 0 2 0     JOSE-7 SCORE 3/15/2018 3/15/2018 4/4/2018   Total Score 2 2 1   Total Score - - -       Pt is unaware of names of the medications he has tried in the past. Remembers being on antipsychotics and them causing him to feel very sedated and \"out of " "it\".   Past medication trials (per Dr. Nguyen documentation 3/15/18): (patient was presented with a list to review all currently available antidepressants, mood stabilizers, tranquilizers, hypnotics and antipsychotics)  New Antidepressants:  Wellbutrin, Zyban, Aplenzin (bupropion)  Mood Stabilizers:  Lithium Carbonate  Newer Antipsychotics:  Seroquel (quetiapine)  Tranquilizers:  Buspar (buspirone)  chantix.   Posibliy other antipsychotics    Supplements: Currently taking: multivitamin and vitamin D 2000units daily.     Current labs include:  Today's Vitals: There were no vitals taken for this visit.- cuff unavailable  BP Readings from Last 3 Encounters:   02/01/18 123/70   09/22/17 129/85   07/27/17 129/79     No results found for: A1C.  Lab Results   Component Value Date    CHOL 213 09/03/2015     Lab Results   Component Value Date    TRIG 83 09/03/2015     Lab Results   Component Value Date    HDL 51 09/03/2015     Lab Results   Component Value Date     09/03/2015       Liver Function Studies -   Recent Labs   Lab Test  09/03/15   0702   PROTTOTAL  7.0   ALBUMIN  3.9   BILITOTAL  0.5   ALKPHOS  170*   AST  15   ALT  25       Last Basic Metabolic Panel:  Lab Results   Component Value Date     09/03/2015      Lab Results   Component Value Date    POTASSIUM 4.5 09/03/2015     Lab Results   Component Value Date    CHLORIDE 106 09/03/2015     Lab Results   Component Value Date    BUN 26 09/03/2015     Lab Results   Component Value Date    CR 1.05 09/03/2015     GFR Estimate   Date Value Ref Range Status   09/03/2015 74 >60 mL/min/1.7m2 Final     Comment:     Non  GFR Calc   07/24/2014 >90 >60 mL/min/1.7m2 Final   01/31/2012 Not Calculated >60 mL/min/1.7m2 Final       TSH   Date Value Ref Range Status   07/24/2014 1.51 0.4 - 5.0 mU/L Final   ]    Most Recent Immunizations   Administered Date(s) Administered     TD (ADULT, 7+) 10/23/2010       ASSESSMENT:                             Current " medications were reviewed today.     Medication Adherence: good, no issues identified    Bipolar II: Stable. Recommend continuing Lamictal 200mg daily at this time. Discussed role of MTM and that I can help work with PCP for med management. Provided pt with my contact info and encouraged pt to reach out with questions/concerns. Pt also has contact information for RN (Katia) at [facility] with whom he reports he has a good relationship with. Pt feels comfortable with plan. Has plenty of refills on Lamictal (90 day supply + 3 refills sent to pharmacy by Dr. Nguyen on 3/15/18).     Supplements: Stable.       PLAN:                            No medication changes today. Continue Lamictal 200mg daily.     I spent 30 minutes with this patient today. A copy of the visit note was provided to the patient's primary care provider.    Will follow up in 6 months.    The patient declined a summary of these recommendations as an after visit summary.     Andra Brumfield, PharmD, BCPP  Medication Therapy Management Pharmacist  [facility]  [phone]

## 2018-04-05 ASSESSMENT — ANXIETY QUESTIONNAIRES: GAD7 TOTAL SCORE: 1

## 2018-04-05 ASSESSMENT — PATIENT HEALTH QUESTIONNAIRE - PHQ9: SUM OF ALL RESPONSES TO PHQ QUESTIONS 1-9: 0

## 2018-06-12 ENCOUNTER — OFFICE VISIT (OUTPATIENT)
Dept: INTERNAL MEDICINE | Facility: CLINIC | Age: 58
End: 2018-06-12
Payer: COMMERCIAL

## 2018-06-12 VITALS
TEMPERATURE: 98.4 F | SYSTOLIC BLOOD PRESSURE: 108 MMHG | RESPIRATION RATE: 12 BRPM | HEIGHT: 71 IN | WEIGHT: 164.6 LBS | DIASTOLIC BLOOD PRESSURE: 84 MMHG | HEART RATE: 78 BPM | BODY MASS INDEX: 23.04 KG/M2

## 2018-06-12 DIAGNOSIS — F31.77 BIPOLAR DISORDER, IN PARTIAL REMISSION, MOST RECENT EPISODE MIXED (H): Primary | ICD-10-CM

## 2018-06-12 PROCEDURE — 99213 OFFICE O/P EST LOW 20 MIN: CPT | Performed by: INTERNAL MEDICINE

## 2018-06-12 ASSESSMENT — ANXIETY QUESTIONNAIRES
5. BEING SO RESTLESS THAT IT IS HARD TO SIT STILL: MORE THAN HALF THE DAYS
1. FEELING NERVOUS, ANXIOUS, OR ON EDGE: MORE THAN HALF THE DAYS
2. NOT BEING ABLE TO STOP OR CONTROL WORRYING: MORE THAN HALF THE DAYS
6. BECOMING EASILY ANNOYED OR IRRITABLE: NEARLY EVERY DAY
7. FEELING AFRAID AS IF SOMETHING AWFUL MIGHT HAPPEN: NOT AT ALL
3. WORRYING TOO MUCH ABOUT DIFFERENT THINGS: MORE THAN HALF THE DAYS
IF YOU CHECKED OFF ANY PROBLEMS ON THIS QUESTIONNAIRE, HOW DIFFICULT HAVE THESE PROBLEMS MADE IT FOR YOU TO DO YOUR WORK, TAKE CARE OF THINGS AT HOME, OR GET ALONG WITH OTHER PEOPLE: SOMEWHAT DIFFICULT
GAD7 TOTAL SCORE: 14

## 2018-06-12 ASSESSMENT — PATIENT HEALTH QUESTIONNAIRE - PHQ9: 5. POOR APPETITE OR OVEREATING: NEARLY EVERY DAY

## 2018-06-12 NOTE — PROGRESS NOTES
"  SUBJECTIVE:   Colby Trent is a 57 year old male who presents to clinic today for the following health issues:    Patient is previously been seen by Dr. Nguyen psychiatry, and has seen Dr. Stone here for internal medicine.  For unknown reasons Dr. Nguyen recommended that he follow-up with me given that Dr. Nguyen no longer seen outpatient psychiatric patients.  Patient at this time does not have a outpatient psychiatrist.    Abnormal Mood Symptoms      Duration: Started a few weeks ago. Noticed it more in the last 4-5 days    Description:  Depression: YES  Anxiety: YES  Panic attacks: no      Accompanying signs and symptoms: see PHQ-9 and JOSE scores    History (similar episodes/previous evaluation): Has manic episodes    Precipitating or alleviating factors: None    Therapies tried and outcome: none    Feels as though his emotions have been in a down cycle. States he has been having concentration issues recently. Slept all day yesterday and is feeling a bit better today.   Patient does not feel suicidal nor does he have a suicidal plan.  See PHQ9    Problem list and histories reviewed & adjusted, as indicated.  Additional history: as documented    Labs reviewed in EPIC    Reviewed and updated as needed this visit by clinical staff       Reviewed and updated as needed this visit by Provider         ROS:  Constitutional, HEENT, cardiovascular, pulmonary, gi and gu systems are negative, except as otherwise noted.    OBJECTIVE:                                                    /84 (BP Location: Right arm, Patient Position: Chair, Cuff Size: Adult Regular)  Pulse 78  Temp 98.4  F (36.9  C) (Oral)  Resp 12  Ht 5' 10.75\" (1.797 m)  Wt 164 lb 9.6 oz (74.7 kg)  BMI 23.12 kg/m2  Body mass index is 23.12 kg/(m^2).  GENERAL APPEARANCE: alert, no distress and smells of tobacco  PSYCH: mentation appears normal and affect normal/bright    Diagnostic test results:  none      ASSESSMENT/PLAN:                      "                               1. Bipolar disorder, in partial remission, most recent episode mixed (H)  Not sure how the patient was sent to see me for his mental health.  He has a internist who was prescribing him his Lamictal previously.  He has a ample supply of this medication given to him by Dr. Nguyen earlier this year.  The from an ongoing psychiatric standpoint he needs to be seen by a mental health specialist.  Either myself or Dr. Stone can continue to see him for his internal medicine issues but my opinion he is not someone that we should be primarily managing his mental health.    - MENTAL HEALTH REFERRAL  - Adult; Psychiatry and Medication Management; Psychiatry; Other: [facility] [phone]; We will contact you to schedule the appointment or please call with any questions      Andrew Durán MD  [facility]

## 2018-06-12 NOTE — MR AVS SNAPSHOT
After Visit Summary   6/12/2018    Colby Trent    MRN: [id number]           Patient Information     Date Of Birth          1960        Visit Information        Provider Department      6/12/2018 2:40 PM Andrew Durán MD [facility]        Today's Diagnoses     Bipolar disorder, in partial remission, most recent episode mixed (H)    -  1       Follow-ups after your visit        Additional Services     MENTAL HEALTH REFERRAL  - Adult; Psychiatry and Medication Management; Psychiatry; Other: [facility] Prov[facility] [phone]; We will contact you to schedule the appointment or please call with any questions       All scheduling is subject to the client's specific insurance plan & benefits, provider/location availability, and provider clinical specialities.  Please arrive 15 minutes early for your first appointment and bring your completed paperwork.    Please be aware that coverage of these services is subject to the terms and limitations of your health insurance plan.  Call member services at your health plan with any benefit or coverage questions.                            Who to contact     If you have questions or need follow up information about today's clinic visit or your schedule please contact [facility] directly at [phone].  Normal or non-critical lab and imaging results will be communicated to you by MyChart, letter or phone within 4 business days after the clinic has received the results. If you do not hear from us within 7 days, please contact the clinic through MyChart or phone. If you have a critical or abnormal lab result, we will notify you by phone as soon as possible.  Submit refill requests through [employer] or call your pharmacy and they will forward the refill request to us. Please allow 3 business days for your refill to be completed.          Additional Information About Your Visit        MyChart  "Information     [facility] gives you secure access to your electronic health record. If you see a primary care provider, you can also send messages to your care team and make appointments. If you have questions, please call your primary care clinic.  If you do not have a primary care provider, please call [phone] and they will assist you.        Care EveryWhere ID     This is your Care EveryWhere ID. This could be used by other organizations to access your [place] medical records  [id number]        Your Vitals Were     Pulse Temperature Respirations Height BMI (Body Mass Index)       78 98.4  F (36.9  C) (Oral) 12 5' 10.75\" (1.797 m) 23.12 kg/m2        Blood Pressure from Last 3 Encounters:   06/12/18 108/84   02/01/18 123/70   09/22/17 129/85    Weight from Last 3 Encounters:   06/12/18 164 lb 9.6 oz (74.7 kg)   02/01/18 158 lb (71.7 kg)   09/22/17 161 lb 11.2 oz (73.3 kg)              We Performed the Following     MENTAL HEALTH REFERRAL  - Adult; Psychiatry and Medication Management; Psychiatry; Other: [facility] Providers [phone]; We will contact you to schedule the appointment or please call with any questions        Primary Care Provider Office Phone # Fax #    Tyler Stone [phone]       [address] [facility] [address]        Equal Access to Services     TU GALICIA AH: Hadii aad ku hadasho Soomaali, waaxda luqadaha, qaybta kaalmada adeegyada, gladys keita hayaltagracia martinez. So [facility] [phone].    ATENCIÓN: Si habla español, tiene a allison disposición servicios gratuitos de asistencia lingüística. Llame al [phone].    We comply with applicable federal civil rights laws and [place] laws. We do not discriminate on the basis of race, color, national origin, age, disability, sex, sexual orientation, or gender identity.            Thank you!     Thank you for choosing [facility]  for your care. Our goal is always to " provide you with excellent care. Hearing back from our patients is one way we can continue to improve our services. Please take a few minutes to complete the written survey that you may receive in the mail after your visit with us. Thank you!             Your Updated Medication List - Protect others around you: Learn how to safely use, store and throw away your medicines at www.disposemymeds.org.          This list is accurate as of 6/12/18  3:24 PM.  Always use your most recent med list.                   Brand Name Dispense Instructions for use Diagnosis    lamoTRIgine 200 MG tablet    LaMICtal    90 tablet    Take 1 per day    Bipolar II disorder (H)       MULTIVITAMIN/MINERALS PO           VITAMIN D (CHOLECALCIFEROL) PO      Take 2,000 Units by mouth daily

## 2018-06-13 ASSESSMENT — ANXIETY QUESTIONNAIRES: GAD7 TOTAL SCORE: 14

## 2018-11-13 ENCOUNTER — OFFICE VISIT (OUTPATIENT)
Dept: URGENT CARE | Facility: URGENT CARE | Age: 58
End: 2018-11-13
Payer: COMMERCIAL

## 2018-11-13 VITALS
TEMPERATURE: 98.7 F | RESPIRATION RATE: 14 BRPM | DIASTOLIC BLOOD PRESSURE: 75 MMHG | HEART RATE: 76 BPM | BODY MASS INDEX: 23.79 KG/M2 | OXYGEN SATURATION: 92 % | SYSTOLIC BLOOD PRESSURE: 133 MMHG | WEIGHT: 169.4 LBS

## 2018-11-13 DIAGNOSIS — R05.8 PRODUCTIVE COUGH: Primary | ICD-10-CM

## 2018-11-13 DIAGNOSIS — J32.0 MAXILLARY SINUSITIS, UNSPECIFIED CHRONICITY: ICD-10-CM

## 2018-11-13 DIAGNOSIS — R68.83 CHILLS: ICD-10-CM

## 2018-11-13 DIAGNOSIS — R09.89 CHEST CONGESTION: ICD-10-CM

## 2018-11-13 PROCEDURE — 99214 OFFICE O/P EST MOD 30 MIN: CPT | Performed by: PHYSICIAN ASSISTANT

## 2018-11-13 RX ORDER — ALBUTEROL SULFATE 90 UG/1
2 AEROSOL, METERED RESPIRATORY (INHALATION) EVERY 6 HOURS
Qty: 1 INHALER | Refills: 0 | Status: SHIPPED | OUTPATIENT
Start: 2018-11-13 | End: 2020-04-13

## 2018-11-13 RX ORDER — AZITHROMYCIN 250 MG/1
TABLET, FILM COATED ORAL
Qty: 6 TABLET | Refills: 0 | Status: SHIPPED | OUTPATIENT
Start: 2018-11-13 | End: 2020-01-22

## 2018-11-13 RX ORDER — BENZONATATE 200 MG/1
200 CAPSULE ORAL 3 TIMES DAILY PRN
Qty: 21 CAPSULE | Refills: 0 | Status: SHIPPED | OUTPATIENT
Start: 2018-11-13 | End: 2020-04-13

## 2018-11-13 NOTE — MR AVS SNAPSHOT
After Visit Summary   11/13/2018    Colby Trent    MRN: [id number]           Patient Information     Date Of Birth          1960        Visit Information        Provider Department      11/13/2018 10:25 AM Naun Sandhu PA-C [facility]        Today's Diagnoses     Productive cough    -  1    Chest congestion        Chills        Maxillary sinusitis, unspecified chronicity           Follow-ups after your visit        Who to contact     If you have questions or need follow up information about today's clinic visit or your schedule please contact [facility] directly at [phone].  Normal or non-critical lab and imaging results will be communicated to you by MyChart, letter or phone within 4 business days after the clinic has received the results. If you do not hear from us within 7 days, please contact the clinic through [employer] or phone. If you have a critical or abnormal lab result, we will notify you by phone as soon as possible.  Submit refill requests through [employer] or call your pharmacy and they will forward the refill request to us. Please allow 3 business days for your refill to be completed.          Additional Information About Your Visit        MyChart Information     [employer] gives you secure access to your electronic health record. If you see a primary care provider, you can also send messages to your care team and make appointments. If you have questions, please call your primary care clinic.  If you do not have a primary care provider, please call [phone] and they will assist you.        Care EveryWhere ID     This is your Care EveryWhere ID. This could be used by other organizations to access your [place] medical records  [id number]        Your Vitals Were     Pulse Temperature Respirations Pulse Oximetry BMI (Body Mass Index)       76 98.7  F (37.1  C) (Oral) 14 92% 23.79 kg/m2        Blood Pressure from  Last 3 Encounters:   11/13/18 133/75   06/12/18 108/84   02/01/18 123/70    Weight from Last 3 Encounters:   11/13/18 169 lb 6.4 oz (76.8 kg)   06/12/18 164 lb 9.6 oz (74.7 kg)   02/01/18 158 lb (71.7 kg)              Today, you had the following     No orders found for display         Today's Medication Changes          These changes are accurate as of 11/13/18 11:29 AM.  If you have any questions, ask your nurse or doctor.               Start taking these medicines.        Dose/Directions    albuterol 108 (90 Base) MCG/ACT inhaler   Commonly known as:  PROVENTIL HFA   Used for:  Productive cough, Chest congestion   Started by:  Naun Sandhu PA-C        Dose:  2 puff   Inhale 2 puffs into the lungs every 6 hours   Quantity:  1 Inhaler   Refills:  0       azithromycin 250 MG tablet   Commonly known as:  ZITHROMAX   Used for:  Productive cough, Chest congestion   Started by:  Naun Sandhu PA-C        2 tabs po qd day 1, then 1 tab po qd days 2-5   Quantity:  6 tablet   Refills:  0       benzonatate 200 MG capsule   Commonly known as:  TESSALON   Used for:  Chest congestion   Started by:  Naun Sandhu PA-C        Dose:  200 mg   Take 1 capsule (200 mg) by mouth 3 times daily as needed for cough   Quantity:  21 capsule   Refills:  0            Where to get your medicines      These medications were sent to [facility]/pharmacy #1600 [address] [address]     Phone:  [phone]     albuterol 108 (90 Base) MCG/ACT inhaler    azithromycin 250 MG tablet    benzonatate 200 MG capsule                Primary Care Provider Office Phone # Fax #    Tyler Stone [phone]       [address] [place] [address]        Equal Access to Services     TU GALICIA AH: Deepali Pineda, amanda nash, gladys sawyer. Formerly [facility] [phone].    ATENCIÓN: Si isai cornejo  a allison disposición servicios gratuitos de asistencia lingüística. Jacque valladares [phone].    We comply with applicable federal civil rights laws and [place] laws. We do not discriminate on the basis of race, color, national origin, age, disability, sex, sexual orientation, or gender identity.            Thank you!     Thank you for choosing [facility]  for your care. Our goal is always to provide you with excellent care. Hearing back from our patients is one way we can continue to improve our services. Please take a few minutes to complete the written survey that you may receive in the mail after your visit with us. Thank you!             Your Updated Medication List - Protect others around you: Learn how to safely use, store and throw away your medicines at www.disposemymeds.org.          This list is accurate as of 11/13/18 11:29 AM.  Always use your most recent med list.                   Brand Name Dispense Instructions for use Diagnosis    albuterol 108 (90 Base) MCG/ACT inhaler    PROVENTIL HFA    1 Inhaler    Inhale 2 puffs into the lungs every 6 hours    Productive cough, Chest congestion       azithromycin 250 MG tablet    ZITHROMAX    6 tablet    2 tabs po qd day 1, then 1 tab po qd days 2-5    Productive cough, Chest congestion       benzonatate 200 MG capsule    TESSALON    21 capsule    Take 1 capsule (200 mg) by mouth 3 times daily as needed for cough    Chest congestion       lamoTRIgine 200 MG tablet    LaMICtal    90 tablet    Take 1 per day    Bipolar II disorder (H)       MULTIVITAMIN/MINERALS PO           VITAMIN D (CHOLECALCIFEROL) PO      Take 2,000 Units by mouth daily

## 2018-11-13 NOTE — PROGRESS NOTES
SUBJECTIVE:   Colby Trent is a 58 year old male presenting with a chief complaint of coughing, chest congestion, SOB.  Onset of symptoms was 2 week(s) ago.  Course of illness is worsening.    Severity moderate  Current and Associated symptoms: chest congestion, coughing, SOB at times  Treatment measures tried include OTC medications.  Predisposing factors include recent illness.    Past Medical History:   Diagnosis Date     Bipolar disorder (H)         Allergies   Allergen Reactions     No Known Drug Allergies          Social History   Substance Use Topics     Smoking status: Current Every Day Smoker     Packs/day: 1.00     Types: Cigarettes     Smokeless tobacco: Never Used      Comment: 1/2 PPD     Alcohol use No       ROS:  CONSTITUTIONAL:NEGATIVE for fever, chills, change in weight  INTEGUMENTARY/SKIN: NEGATIVE for worrisome rashes, moles or lesions  EYES: NEGATIVE for vision changes or irritation  ENT/MOUTH: POSITIVE for nasal congestion  RESP:POSITIVE for cough-productive and wheezing  CV: NEGATIVE for chest pain, palpitations or peripheral edema  GI: NEGATIVE for nausea, abdominal pain, heartburn, or change in bowel habits  MUSCULOSKELETAL: NEGATIVE for significant arthralgias or myalgia  NEURO: NEGATIVE for weakness, dizziness or paresthesias    OBJECTIVE  :/75  Pulse 76  Temp 98.7  F (37.1  C) (Oral)  Resp 14  Wt 169 lb 6.4 oz (76.8 kg)  SpO2 92%  BMI 23.79 kg/m2  GENERAL APPEARANCE: healthy, alert and no distress  EYES: EOMI,  PERRL, conjunctiva clear  HENT: ear canals and TM's normal.  Nose and mouth without ulcers, erythema or lesions  NECK: supple, nontender, no lymphadenopathy  RESP: expiratory wheezes mild and generalized  CV: regular rates and rhythm, normal S1 S2, no murmur noted  ABDOMEN:  soft, nontender, no HSM or masses and bowel sounds normal  NEURO: Normal strength and tone, sensory exam grossly normal,  normal speech and mentation  SKIN: no suspicious lesions or  rashes    ASSESSMENT/PLAN      ICD-10-CM    1. Productive cough R05 azithromycin (ZITHROMAX) 250 MG tablet     albuterol (PROVENTIL HFA) 108 (90 Base) MCG/ACT inhaler   2. Chest congestion R09.89 azithromycin (ZITHROMAX) 250 MG tablet     albuterol (PROVENTIL HFA) 108 (90 Base) MCG/ACT inhaler     benzonatate (TESSALON) 200 MG capsule   3. Chills R68.83    4. Maxillary sinusitis, unspecified chronicity J32.0        Orders Placed This Encounter     azithromycin (ZITHROMAX) 250 MG tablet     albuterol (PROVENTIL HFA) 108 (90 Base) MCG/ACT inhaler     benzonatate (TESSALON) 200 MG capsule       Fluids, rest  Tobacco cessation discussed  Follow up with PCP as needed  See orders in Epic

## 2019-05-24 ENCOUNTER — OFFICE VISIT (OUTPATIENT)
Dept: URGENT CARE | Facility: URGENT CARE | Age: 59
End: 2019-05-24
Payer: COMMERCIAL

## 2019-05-24 VITALS
WEIGHT: 169 LBS | OXYGEN SATURATION: 92 % | SYSTOLIC BLOOD PRESSURE: 122 MMHG | BODY MASS INDEX: 23.74 KG/M2 | DIASTOLIC BLOOD PRESSURE: 72 MMHG | HEART RATE: 75 BPM

## 2019-05-24 DIAGNOSIS — H93.8X1 PLUGGED FEELING IN EAR, RIGHT: Primary | ICD-10-CM

## 2019-05-24 DIAGNOSIS — H61.21 IMPACTED CERUMEN OF RIGHT EAR: ICD-10-CM

## 2019-05-24 PROCEDURE — 69209 REMOVE IMPACTED EAR WAX UNI: CPT | Mod: RT | Performed by: FAMILY MEDICINE

## 2019-05-24 PROCEDURE — 99213 OFFICE O/P EST LOW 20 MIN: CPT | Mod: 25 | Performed by: FAMILY MEDICINE

## 2019-05-24 NOTE — PATIENT INSTRUCTIONS
Patient Education       Earwax, Home Treatment    Everyone produces earwax from the lining of the ear canal. It serves to lubricate and protect the ear. The wax that forms in the canal naturally moves toward the outside of the ear and falls out. Sometimes the ear canal may contain too much wax. This can cause a blockage and loss of hearing. Directions are given below for home treatment.  Home care  If your doctor has advised you to remove a wax blockage yourself, follow these directions:    Unless a medicine was prescribed, you may use an over-the-counter product made for clearing earwax. These contain carbamide peroxide. Lie down with the blocked ear facing upward. Apply one dropper full of medicine and wait a few minutes. Grasp the outer ear and wiggle it to help the solution enter the canal.    Lean over a sink or basin with the blocked ear facing downward. Use a bulb syringe filled with warm (not hot or cold) water to rinse the ear several times. Use gentle pressure only.    If you are having trouble draining the water out of your ear canal, put a few drops of rubbing alcohol (isopropyl alcohol) into the ear canal. This will help remove the remaining water.    Repeat this procedure once a day for up to three days, or until your hearing is back to normal. Do not use this treatment for more than three days in a row.  Don ts    Don t use cold water to rinse the ear. This will make you dizzy.    Don t perform this procedure if you have an ear infection.    Don t perform this procedure if you have a ruptured eardrum.    Don t use cotton swabs, matches, hairpins, keys, or other objects to  clean  the ear canal. This can cause infection of the ear canal or rupture the eardrum. Because of their size and shape, cotton swabs can push earwax deeper into the ear canal instead of removing it.  Follow-up care  Follow up with your health care provider if you are not improving after three cleaning attempts, or as  advised.  When to seek medical advice  Call your health care provider right away if any of these occur:    Worsening ear pain    Fever of 101 F (38.3 C) or higher, or as directed by your health care provider    Hearing does not return to normal after three days of treatment    Fluid drainage or bleeding from the ear canal    Swelling, redness, or tenderness of the outer ear    Headache, neck pain, or stiff neck    1751-6920 [employer]. [address] [place] [address], [place] [id number]. All rights reserved. This information is not intended as a substitute for professional medical care. Always follow your healthcare professional's instructions.

## 2019-05-24 NOTE — PROGRESS NOTES
SUBJECTIVE:  Colby Trent is a 58 year old male who presents with right ear fullness, blockage and plugged feeling , cannot hear much  for 1 day(s).   Severity: moderate   Timing:sudden onset  Additional symptoms include none.      History of recurrent otitis: no    Past Medical History:   Diagnosis Date     Bipolar disorder (H)      Current Outpatient Medications   Medication Sig Dispense Refill     albuterol (PROVENTIL HFA) 108 (90 Base) MCG/ACT inhaler Inhale 2 puffs into the lungs every 6 hours 1 Inhaler 0     azithromycin (ZITHROMAX) 250 MG tablet 2 tabs po qd day 1, then 1 tab po qd days 2-5 6 tablet 0     benzonatate (TESSALON) 200 MG capsule Take 1 capsule (200 mg) by mouth 3 times daily as needed for cough 21 capsule 0     lamoTRIgine (LAMICTAL) 200 MG tablet Take 1 per day 90 tablet 3     [id number]-FA-[id number] (MULTIVITAMIN/MINERALS PO)        VITAMIN D, CHOLECALCIFEROL, PO Take 2,000 Units by mouth daily       Social History     Tobacco Use     Smoking status: Current Every Day Smoker     Packs/day: 1.00     Types: Cigarettes     Smokeless tobacco: Never Used     Tobacco comment: 1/2 PPD   Substance Use Topics     Alcohol use: No       ROS:   10 point ROS of systems including Constitutional, Eyes, Respiratory, Cardiovascular, Gastroenterology, Genitourinary, Integumentary, Muscularskeletal, Psychiatric were all negative except for pertinent positives noted in my HPI           OBJECTIVE:  /72   Pulse 75   Wt 76.7 kg (169 lb)   SpO2 92%   BMI 23.74 kg/m     EXAM:  The right TM is normal: no effusions, no erythema, and normal landmarks     The right auditory canal is normal and without drainage, edema or erythema there was still some ear wax noted   The left TM is normal: no effusions, no erythema, and normal landmarks  The left auditory canal is normal and without drainage, edema or erythema  Oropharynx exam is normal: no lesions, erythema, adenopathy or exudate.  GENERAL: no acute  distress  EYES: EOMI,  PERRL, conjunctiva clear  NECK: supple, non-tender to palpation, no adenopathy noted  RESP: lungs clear to auscultation - no rales, rhonchi or wheezes  CV: regular rates and rhythm, normal S1 S2, no murmur noted  SKIN: no suspicious lesions or rashes     ASSESSMENT:  Cerumen impaction    PLAN:  This with patient to use Debrox to help with wax cleaning  Was given a handout for impacted earwax  Follow up if  symptoms fail to improve or worsens   Pt understood and agreed with plan     Donita Felton MD     See orders in Epic

## 2019-07-23 ENCOUNTER — TELEPHONE (OUTPATIENT)
Dept: INTERNAL MEDICINE | Facility: CLINIC | Age: 59
End: 2019-07-23

## 2019-07-23 NOTE — TELEPHONE ENCOUNTER
We received a refill request for Lamictal 200 mg tabs today under former [facility] provider, Dr. Kenji Nguyen. This provider left [facility] in March 2018. Per pharmacy, patient has not refilled Lamictal since February 2019.  I advised them to cancel the Lamictal from his profile as he cannot restart Lamictal at 200 mg per day.    I attempted to reach out to Colby to discuss his medications. General message left that refill was denied and he will need to schedule with primary care. A new MH referral will be needed if he and his PCP desire.    Patient's last visit with a PCP was over a year ago. At the 6/12/18 office visit, Dr. Antunez referred patient to [facility] for ongoing psychiatry.      No future appointments scheduled.

## 2019-09-29 ENCOUNTER — HEALTH MAINTENANCE LETTER (OUTPATIENT)
Age: 59
End: 2019-09-29

## 2019-10-30 NOTE — TELEPHONE ENCOUNTER
Faxed order to [facility] medical in [place], [phone].  JOSE Grey MA February 1, 2017 4:32 PM     · Dietary education

## 2019-12-17 ENCOUNTER — OFFICE VISIT (OUTPATIENT)
Dept: URGENT CARE | Facility: URGENT CARE | Age: 59
End: 2019-12-17
Payer: COMMERCIAL

## 2019-12-17 VITALS
DIASTOLIC BLOOD PRESSURE: 70 MMHG | HEART RATE: 67 BPM | TEMPERATURE: 97.7 F | RESPIRATION RATE: 16 BRPM | WEIGHT: 169 LBS | OXYGEN SATURATION: 98 % | SYSTOLIC BLOOD PRESSURE: 122 MMHG | BODY MASS INDEX: 23.74 KG/M2

## 2019-12-17 DIAGNOSIS — F31.9 BIPOLAR AFFECTIVE DISORDER, REMISSION STATUS UNSPECIFIED (H): ICD-10-CM

## 2019-12-17 DIAGNOSIS — J01.90 ACUTE SINUSITIS WITH SYMPTOMS > 10 DAYS: Primary | ICD-10-CM

## 2019-12-17 PROCEDURE — 99214 OFFICE O/P EST MOD 30 MIN: CPT | Performed by: PHYSICIAN ASSISTANT

## 2019-12-17 RX ORDER — CEFUROXIME AXETIL 500 MG/1
500 TABLET ORAL 2 TIMES DAILY
Qty: 20 TABLET | Refills: 0 | Status: SHIPPED | OUTPATIENT
Start: 2019-12-17 | End: 2020-01-22

## 2019-12-17 RX ORDER — FLUTICASONE PROPIONATE 50 MCG
2 SPRAY, SUSPENSION (ML) NASAL DAILY
Qty: 16 G | Refills: 3 | Status: SHIPPED | OUTPATIENT
Start: 2019-12-17 | End: 2020-04-13

## 2019-12-17 NOTE — PATIENT INSTRUCTIONS
(J01.90) Acute sinusitis with symptoms > 10 days  (primary encounter diagnosis)  Comment:   Plan: cefuroxime (CEFTIN) 500 MG tablet, fluticasone         (FLONASE) 50 MCG/ACT nasal spray            Establish care with primary clinic and or psychiatry for refill of Lamotrigine.

## 2019-12-17 NOTE — PROGRESS NOTES
Patient presents with:  Sinus Problem: sinus pressure, drainage, sneezing X 1 week    SUBJECTIVE:   Colby Trent is a 59 year old male presenting with a chief complaint of   1) sinus congestion for about 2 weeks, worse in the past week.  Improved then worsened.    2) slight cough.     Onset of symptoms was as above.  Course of illness is worsening.    Severity moderate  Current and Associated symptoms: as above.  Low grade fevers.    Treatment measures tried include None tried.  Predisposing factors include No flu vaccine, never gets them.    Past Medical History:   Diagnosis Date     Bipolar disorder (H)      Patient Active Problem List   Diagnosis     CARDIOVASCULAR SCREENING; LDL GOAL LESS THAN 130     Bipolar disorder (H)     Plantar fascial fibromatosis     Tobacco abuse     Abnormal results of liver function studies     Acute deep vein thrombosis (DVT) of popliteal vein of right lower extremity (H)     Right ankle pain     Fracture of fibula, distal, closed     Social History     Tobacco Use     Smoking status: Current Every Day Smoker     Packs/day: 1.00     Types: Cigarettes     Smokeless tobacco: Never Used     Tobacco comment: 1/2 PPD   Substance Use Topics     Alcohol use: No       ROS:  CONSTITUTIONAL:as per HPI  INTEGUMENTARY/SKIN: NEGATIVE for worrisome rashes, moles or lesions  EYES: NEGATIVE for vision changes or irritation  ENT/MOUTH: as per HPI  RESP:as per HPI  CV: NEGATIVE for chest pain, palpitations or peripheral edema  GI: NEGATIVE for nausea, abdominal pain, heartburn, or change in bowel habits  : negative for dysuria, hematuria, decreased urinary stream, erectile dysfunction  MUSCULOSKELETAL: NEGATIVE for significant arthralgias or myalgia  NEURO: NEGATIVE for weakness, dizziness or paresthesias  Review of systems negative except as stated above.    OBJECTIVE  :/70   Pulse 67   Temp 97.7  F (36.5  C) (Oral)   Resp 16   Wt 76.7 kg (169 lb)   SpO2 98%   BMI 23.74 kg/m     GENERAL APPEARANCE: healthy, alert and no distress  EYES: EOMI,  PERRL, conjunctiva clear  HENT: ear canals and TM's normal.  Nose and mouth without ulcers, erythema or lesions  HENT: nasal turbinates erythematous, swollen and rhinorrhea yellow  NECK: supple, nontender, no lymphadenopathy  RESP: lungs clear to auscultation - no rales, rhonchi or wheezes  CV: regular rates and rhythm, normal S1 S2, no murmur noted  ABDOMEN:  soft, nontender, no HSM or masses and bowel sounds normal  NEURO: Normal strength and tone, sensory exam grossly normal,  normal speech and mentation  SKIN: no suspicious lesions or rashes    Patient would like a refill of his lamotrigine, he states that Dr. Nguyen gave him a lot of refills before his was retiring.  He has not yet established with a new psychiatrist.      (J01.90) Acute sinusitis with symptoms > 10 days  (primary encounter diagnosis)  Comment:   Plan: cefuroxime (CEFTIN) 500 MG tablet, fluticasone         (FLONASE) 50 MCG/ACT nasal spray            (F31.9) Bipolar affective disorder, remission status unspecified (H)  Comment:   Plan:   Establish care with primary clinic and or psychiatry for refill of Lamotrigine.      Patient expresses understanding and agreement with the assessment and plan as above.

## 2020-01-12 ENCOUNTER — OFFICE VISIT (OUTPATIENT)
Dept: URGENT CARE | Facility: URGENT CARE | Age: 60
End: 2020-01-12
Payer: COMMERCIAL

## 2020-01-12 VITALS
HEART RATE: 69 BPM | RESPIRATION RATE: 16 BRPM | TEMPERATURE: 97.3 F | OXYGEN SATURATION: 97 % | HEIGHT: 71 IN | BODY MASS INDEX: 23.57 KG/M2

## 2020-01-12 DIAGNOSIS — Z72.0 TOBACCO ABUSE: ICD-10-CM

## 2020-01-12 DIAGNOSIS — J06.9 VIRAL URI: Primary | ICD-10-CM

## 2020-01-12 LAB
FLUAV+FLUBV AG SPEC QL: NEGATIVE
FLUAV+FLUBV AG SPEC QL: NEGATIVE
SPECIMEN SOURCE: NORMAL

## 2020-01-12 PROCEDURE — 99213 OFFICE O/P EST LOW 20 MIN: CPT | Performed by: FAMILY MEDICINE

## 2020-01-12 PROCEDURE — 87804 INFLUENZA ASSAY W/OPTIC: CPT | Performed by: FAMILY MEDICINE

## 2020-01-12 RX ORDER — FLUTICASONE PROPIONATE 50 MCG
2 SPRAY, SUSPENSION (ML) NASAL DAILY
Qty: 15.8 ML | Refills: 0 | Status: SHIPPED | OUTPATIENT
Start: 2020-01-12 | End: 2020-04-13

## 2020-01-12 RX ORDER — BENZONATATE 100 MG/1
100 CAPSULE ORAL 3 TIMES DAILY PRN
Qty: 21 CAPSULE | Refills: 0 | Status: SHIPPED | OUTPATIENT
Start: 2020-01-12 | End: 2020-01-22

## 2020-01-12 NOTE — PROGRESS NOTES
"SUBJECTIVE: Colby Trent is a 59 year old male presenting with a chief complaint of nasal congestion and cough .  Onset of symptoms was 2 day(s) ago.  Course of illness is same.    Severity moderate  Current and Associated symptoms: stuffy nose and cough - non-productive  Treatment measures tried include Tylenol/Ibuprofen.  Predisposing factors include tobacco use.    Past Medical History:   Diagnosis Date     Bipolar disorder (H)      Allergies   Allergen Reactions     No Known Drug Allergies      Social History     Tobacco Use     Smoking status: Current Every Day Smoker     Packs/day: 1.00     Types: Cigarettes     Smokeless tobacco: Never Used     Tobacco comment: 1/2 PPD   Substance Use Topics     Alcohol use: No       ROS:  SKIN: no rash  GI: no vomiting    OBJECTIVE:  Pulse 69   Temp 97.3  F (36.3  C) (Tympanic)   Resp 16   Ht 1.803 m (5' 11\")   SpO2 97%   BMI 23.57 kg/m  GENERAL APPEARANCE: healthy, alert and no distress  EYES: EOMI,  PERRL, conjunctiva clear  HENT: ear canals and TM's normal.  Nose and mouth without ulcers, erythema or lesions  NECK: supple, nontender, no lymphadenopathy  RESP: lungs clear to auscultation - no rales, rhonchi or wheezes  SKIN: no suspicious lesions or rashes      ICD-10-CM    1. Viral URI J06.9 Influenza A/B antigen     fluticasone (FLONASE) 50 MCG/ACT nasal spray     benzonatate (TESSALON) 100 MG capsule   2. Tobacco abuse Z72.0      Quit tobacco    Fluids/Rest, f/u if worse/not any better    "

## 2020-01-22 ENCOUNTER — OFFICE VISIT (OUTPATIENT)
Dept: INTERNAL MEDICINE | Facility: CLINIC | Age: 60
End: 2020-01-22
Payer: COMMERCIAL

## 2020-01-22 VITALS
HEART RATE: 73 BPM | TEMPERATURE: 98.3 F | DIASTOLIC BLOOD PRESSURE: 78 MMHG | WEIGHT: 173.1 LBS | RESPIRATION RATE: 18 BRPM | BODY MASS INDEX: 24.14 KG/M2 | SYSTOLIC BLOOD PRESSURE: 110 MMHG | OXYGEN SATURATION: 96 %

## 2020-01-22 DIAGNOSIS — J06.9 UPPER RESPIRATORY TRACT INFECTION, UNSPECIFIED TYPE: Primary | ICD-10-CM

## 2020-01-22 DIAGNOSIS — F31.81 BIPOLAR II DISORDER (H): ICD-10-CM

## 2020-01-22 DIAGNOSIS — Z72.0 TOBACCO ABUSE: ICD-10-CM

## 2020-01-22 LAB
FLUAV+FLUBV AG SPEC QL: NEGATIVE
FLUAV+FLUBV AG SPEC QL: NEGATIVE
SPECIMEN SOURCE: NORMAL

## 2020-01-22 PROCEDURE — 99214 OFFICE O/P EST MOD 30 MIN: CPT | Performed by: INTERNAL MEDICINE

## 2020-01-22 PROCEDURE — 87804 INFLUENZA ASSAY W/OPTIC: CPT | Performed by: INTERNAL MEDICINE

## 2020-01-22 RX ORDER — LAMOTRIGINE 200 MG/1
TABLET ORAL
Qty: 90 TABLET | Refills: 0 | Status: SHIPPED | OUTPATIENT
Start: 2020-01-22 | End: 2020-05-18

## 2020-01-22 NOTE — PROGRESS NOTES
Subjective     Colby Trent is a 59 year old male who presents to clinic today for the following health issues:    HPI     RESPIRATORY SYMPTOMS    Patient was seen in the middle of December 2019 and given a diagnosis of sinusitis for which she was treated with Ceftin.  He was then seen back in the clinic in urgent care in the middle of January of this year at which time he was diagnosed with a viral upper respiratory infection and given a supportive course of an anti-cough therapy after a influenza screen was negative.      Duration:     Description  nasal congestion, cough, fatigue/malaise, myalgias and nausea    Severity: moderate    Accompanying signs and symptoms: None    History (predisposing factors):  tobacco abuse    Precipitating or alleviating factors: None    Therapies tried and outcome:  z-pack, flonase, benzonatate       Patient Active Problem List   Diagnosis     CARDIOVASCULAR SCREENING; LDL GOAL LESS THAN 130     Bipolar disorder (H)     Plantar fascial fibromatosis     Tobacco abuse     Abnormal results of liver function studies     Acute deep vein thrombosis (DVT) of popliteal vein of right lower extremity (H)     Right ankle pain     Fracture of fibula, distal, closed     Past Surgical History:   Procedure Laterality Date     COLONOSCOPY  09/26/13    [facility]       Social History     Tobacco Use     Smoking status: Current Every Day Smoker     Packs/day: 1.00     Types: Cigarettes     Smokeless tobacco: Never Used     Tobacco comment: 1/2 PPD   Substance Use Topics     Alcohol use: No     Family History   Problem Relation Age of Onset     Diabetes Father      Gastrointestinal Disease Father         multiple precancerous polyps     Cardiovascular Mother         cardiomyopathy     Depression Mother      Cancer - colorectal Maternal Uncle          Current Outpatient Medications   Medication Sig Dispense Refill     albuterol (PROVENTIL HFA) 108 (90 Base) MCG/ACT inhaler Inhale 2  puffs into the lungs every 6 hours 1 Inhaler 0     lamoTRIgine (LAMICTAL) 200 MG tablet Take 1 per day 90 tablet 3     Ehkyla-ClIix-BbMnru-FA-Omega (MULTIVITAMIN/MINERALS PO)        VITAMIN D, CHOLECALCIFEROL, PO Take 2,000 Units by mouth daily       benzonatate (TESSALON) 200 MG capsule Take 1 capsule (200 mg) by mouth 3 times daily as needed for cough (Patient not taking: Reported on 1/22/2020) 21 capsule 0     fluticasone (FLONASE) 50 MCG/ACT nasal spray Spray 2 sprays into both nostrils daily (Patient not taking: Reported on 1/22/2020) 15.8 mL 0     fluticasone (FLONASE) 50 MCG/ACT nasal spray Spray 2 sprays into both nostrils daily (Patient not taking: Reported on 1/22/2020) 16 g 3     Allergies   Allergen Reactions     No Known Drug Allergies      BP Readings from Last 3 Encounters:   12/17/19 122/70   05/24/19 122/72   11/13/18 133/75    Wt Readings from Last 3 Encounters:   12/17/19 76.7 kg (169 lb)   05/24/19 76.7 kg (169 lb)   11/13/18 76.8 kg (169 lb 6.4 oz)         Reviewed and updated as needed this visit by Provider       Review of Systems   ROS COMP: CONSTITUTIONAL: NEGATIVE for fever, chills, change in weight  ENT/MOUTH: NEGATIVE for ear, mouth and throat problems  RESP: NEGATIVE for significant cough or SOB  CV: NEGATIVE for chest pain, palpitations or peripheral edema  GI: NEGATIVE for nausea, abdominal pain, heartburn, or change in bowel habits  : NEGATIVE for frequency, dysuria, or hematuria  MUSCULOSKELETAL: NEGATIVE for significant arthralgias or myalgia  NEURO: NEGATIVE for weakness, dizziness or paresthesias  HEME: NEGATIVE for bleeding problems  PSYCHIATRIC: NEGATIVE for changes in mood or affect      Objective    /78   Pulse 73   Temp 98.3  F (36.8  C) (Oral)   Resp 18   Wt 78.5 kg (173 lb 1.6 oz)   SpO2 96%   BMI 24.14 kg/m    Body mass index is 24.14 kg/m .  Physical Exam   GENERAL:  alert and no distress, slightly unkept  EYES: Eyes grossly normal to inspection, PERRL and  conjunctivae and sclerae normal  HENT: ear canals and TM's normal, nose and mouth without ulcers or lesions  NECK: no adenopathy, no asymmetry, masses, or scars and thyroid normal to palpation  RESP: lungs clear to auscultation - no rales, rhonchi or wheezes  CV: regular rate and rhythm, normal S1 S2, no S3 or S4, no murmur, click or rub, no peripheral edema and peripheral pulses strong  MS: no gross musculoskeletal defects noted, no edema  NEURO: No focal changes  PSYCH: mentation appears normal, affect normal/bright        Assessment & Plan     1. Upper respiratory tract infection, unspecified type  Does appear to be viral without any focal changes on exam that would support any bacterial process acutely.  Vital signs appear stable general chest exam is clear.  I have suggested a influenza screen to make sure he has not developed influenza as a source for his symptoms in the interim between when last tested.  If negative then supportive care is recommended.  If positive will treat accordingly.  - Influenza A/B antigen    2. Bipolar II disorder (H)  Patient states he no longer sees his mental health provider would like refill of his Lamictal.  He states he is taking 1 tablet daily and the medicine is working well for him in regards to taking out the peaks and valleys of his bipolar disease.  He was advised that he needs to follow-up with his new mental health provider in case he does have a relapse of his symptoms.  - lamoTRIgine (LAMICTAL) 200 MG tablet; Take 1 per day  Dispense: 90 tablet; Refill: 0    3. Tobacco abuse  Smoking cessation was advised and the risks of continued smoking in regards to this patients health history was reiterated. Options of smoking cessation were also discussed. This time extended beyond the routine exam.    Patient was also advised that he is well overdue for his annual physical exam as well as routine lab work and should schedule this accordingly.    Tobacco Cessation:   reports  that he has been smoking cigarettes. He has been smoking about 1.00 pack per day. He has never used smokeless tobacco.  Tobacco Cessation Action Plan: Information offered: Patient not interested at this time    See Patient Instructions    Return in about 1 week (around 1/29/2020) for if symptoms recur or worsen, f/u mental health provider or counselor.    Tyler Stone MD  [facility]

## 2020-04-13 ENCOUNTER — VIRTUAL VISIT (OUTPATIENT)
Dept: INTERNAL MEDICINE | Facility: CLINIC | Age: 60
End: 2020-04-13
Payer: COMMERCIAL

## 2020-04-13 ENCOUNTER — CONSENT FORM (OUTPATIENT)
Dept: INTERNAL MEDICINE | Facility: CLINIC | Age: 60
End: 2020-04-13

## 2020-04-13 DIAGNOSIS — M54.50 ACUTE LOW BACK PAIN, UNSPECIFIED BACK PAIN LATERALITY, UNSPECIFIED WHETHER SCIATICA PRESENT: Primary | ICD-10-CM

## 2020-04-13 PROCEDURE — 99213 OFFICE O/P EST LOW 20 MIN: CPT | Mod: TEL | Performed by: INTERNAL MEDICINE

## 2020-04-13 RX ORDER — CYCLOBENZAPRINE HCL 10 MG
10 TABLET ORAL 3 TIMES DAILY PRN
Qty: 30 TABLET | Refills: 0 | Status: SHIPPED | OUTPATIENT
Start: 2020-04-13 | End: 2020-10-02

## 2020-04-13 NOTE — PROGRESS NOTES
"Colby Trent is a 59 year old male who is being evaluated via a billable telephone visit.      The patient has been notified of following:     \"This telephone visit will be conducted via a call between you and your physician/provider. We have found that certain health care needs can be provided without the need for a physical exam.  This service lets us provide the care you need with a short phone conversation.  If a prescription is necessary we can send it directly to your pharmacy.  If lab work is needed we can place an order for that and you can then stop by our lab to have the test done at a later time.    Telephone visits are billed at different rates depending on your insurance coverage. During this emergency period, for some insurers they may be billed the same as an in-person visit.  Please reach out to your insurance provider with any questions.    If during the course of the call the physician/provider feels a telephone visit is not appropriate, you will not be charged for this service.\"    Patient has given verbal consent for Telephone visit?  Yes    How would you like to obtain your AVS? Chuckt    Subjective     Colby Trent is a 59 year old male who presents to clinic today for the following health issues:    Back Pain       Duration: 5 days, worsening yesterday         Specific cause: none    Description:   Location of pain:  low back bilateral R>L   Character of pain: sharp pain with movement, dull ache   Pain radiation:  none  New numbness or weakness in legs, not attributed to pain:  no     Intensity: Currently 2/10, At its worst 8/10    History:   Pain interferes with job: Not currently, goes back to work in Wednesday   History of back problems: no prior back problems  Any previous MRI or X-rays: None  Sees a specialist for back pain:  No  Therapies tried without relief: acetaminophen (Tylenol)    Alleviating factors:   Improved by: Heating pad       Precipitating factors:  Worsened " by: transitioning from sitting to standing     Patient denies Fevers, pain, no obvious trauma, no urinary complaints.  No rash.  Appears slightly better with activity.      Accompanying Signs & Symptoms:  Risk of Fracture:  None  Risk of Cauda Equina:  None  Risk of Infection:  None  Risk of Cancer:  None  Risk of Ankylosing Spondylitis:  Onset at age <35, male, AND morning back stiffness. no         Patient Active Problem List   Diagnosis     CARDIOVASCULAR SCREENING; LDL GOAL LESS THAN 130     Bipolar disorder (H)     Plantar fascial fibromatosis     Tobacco abuse     Abnormal results of liver function studies     Acute deep vein thrombosis (DVT) of popliteal vein of right lower extremity (H)     Right ankle pain     Fracture of fibula, distal, closed     Past Surgical History:   Procedure Laterality Date     COLONOSCOPY  09/26/13    [facility]       Social History     Tobacco Use     Smoking status: Current Every Day Smoker     Packs/day: 1.00     Types: Cigarettes     Smokeless tobacco: Never Used     Tobacco comment: 1/2 PPD   Substance Use Topics     Alcohol use: No     Family History   Problem Relation Age of Onset     Diabetes Father      Gastrointestinal Disease Father         multiple precancerous polyps     Cardiovascular Mother         cardiomyopathy     Depression Mother      Cancer - colorectal Maternal Uncle          Current Outpatient Medications   Medication Sig Dispense Refill     lamoTRIgine (LAMICTAL) 200 MG tablet Take 1 per day 90 tablet 0     Undovf-LiAcv-XbLfus-FA-Omega (MULTIVITAMIN/MINERALS PO)        VITAMIN D, CHOLECALCIFEROL, PO Take 2,000 Units by mouth daily       Allergies   Allergen Reactions     No Known Drug Allergies      BP Readings from Last 3 Encounters:   01/22/20 110/78   12/17/19 122/70   05/24/19 122/72    Wt Readings from Last 3 Encounters:   01/22/20 78.5 kg (173 lb 1.6 oz)   12/17/19 76.7 kg (169 lb)   05/24/19 76.7 kg (169 lb)           Reviewed and updated  as needed this visit by Provider         Review of Systems :    ROS COMP: CONSTITUTIONAL: NEGATIVE for fever, chills, change in weight  ENT/MOUTH: NEGATIVE for ear, mouth and throat problems  RESP: NEGATIVE for significant cough or SOB  CV: NEGATIVE for chest pain, palpitations or peripheral edema  GI: NEGATIVE for nausea, abdominal pain, heartburn, or change in bowel habits  : NEGATIVE for frequency, dysuria, or hematuria  NEURO: NEGATIVE for weakness, dizziness or paresthesias  ENDOCRINE: NEGATIVE for temperature intolerance, skin/hair changes  HEME: NEGATIVE for bleeding problems  PSYCHIATRIC: + for changes in mood or affect       Objective   Reported vitals:  There were no vitals taken for this visit.   healthy, alert and no distress  PSYCH: Alert and oriented times 3; coherent speech, normal   rate and volume, able to articulate logical thoughts, able   to abstract reason, no tangential thoughts, no hallucinations   or delusions  His affect is normal  RESP: No cough, no audible wheezing, able to talk in full sentences  Remainder of exam unable to be completed due to telephone visits            Assessment/Plan:  1. Acute low back pain, unspecified back pain laterality, unspecified whether sciatica present  Discussed with patient options appears to be more musculoskeletal without any focal radicular change, fever or urinary complaints.  - cyclobenzaprine (FLEXERIL) 10 MG tablet; Take 1 tablet (10 mg) by mouth 3 times daily as needed for muscle spasms  Dispense: 30 tablet; Refill: 0  - [facility]; Future  Advised to call if symptoms worsen or change.    Return in about 1 week (around 4/20/2020) for if symptoms recur or worsen, follow-up physical therapy.      Phone call duration:  12 minutes    Tyler Stone MD

## 2020-05-17 DIAGNOSIS — F31.81 BIPOLAR II DISORDER (H): ICD-10-CM

## 2020-05-18 RX ORDER — LAMOTRIGINE 200 MG/1
TABLET ORAL
Qty: 90 TABLET | Refills: 0 | Status: SHIPPED | OUTPATIENT
Start: 2020-05-18 | End: 2022-05-17

## 2020-05-18 NOTE — TELEPHONE ENCOUNTER
Routing refill request to provider for review/approval because:  Labs not current:  CBC/ALT/platelet

## 2020-10-02 ENCOUNTER — OFFICE VISIT (OUTPATIENT)
Dept: URGENT CARE | Facility: URGENT CARE | Age: 60
End: 2020-10-02
Payer: COMMERCIAL

## 2020-10-02 VITALS
SYSTOLIC BLOOD PRESSURE: 122 MMHG | DIASTOLIC BLOOD PRESSURE: 71 MMHG | TEMPERATURE: 99.2 F | RESPIRATION RATE: 14 BRPM | OXYGEN SATURATION: 98 % | HEART RATE: 76 BPM

## 2020-10-02 DIAGNOSIS — R05.9 COUGH: Primary | ICD-10-CM

## 2020-10-02 PROCEDURE — U0003 INFECTIOUS AGENT DETECTION BY NUCLEIC ACID (DNA OR RNA); SEVERE ACUTE RESPIRATORY SYNDROME CORONAVIRUS 2 (SARS-COV-2) (CORONAVIRUS DISEASE [COVID-19]), AMPLIFIED PROBE TECHNIQUE, MAKING USE OF HIGH THROUGHPUT TECHNOLOGIES AS DESCRIBED BY CMS-2020-01-R: HCPCS | Performed by: PHYSICIAN ASSISTANT

## 2020-10-02 PROCEDURE — 99213 OFFICE O/P EST LOW 20 MIN: CPT | Performed by: PHYSICIAN ASSISTANT

## 2020-10-02 ASSESSMENT — ENCOUNTER SYMPTOMS
COUGH: 1
GASTROINTESTINAL NEGATIVE: 1
APPETITE CHANGE: 0
RHINORRHEA: 1
DIZZINESS: 0
FEVER: 0
FATIGUE: 0
SHORTNESS OF BREATH: 0
HEADACHES: 1

## 2020-10-02 NOTE — PATIENT INSTRUCTIONS
Patient Education     Understanding COVID-19 (Coronavirus Disease 2019)  COVID-19 is an illness of the lungs. It causes fever, coughing and trouble breathing. The illness is caused by a new virus in the coronavirus family called SARS-CoV-2.  First seen in late 2019, this virus has spread to many cities and countries around the world. It seems to spread and infect people fairly easily. Scientists are working to understand it better.  For the latest information, visit the CDC website at www.cdc.gov/coronavirus/2019-ncov.html Or call 281-ZEK-UNUR (332-607-6451).   How does COVID-19 spread?  The virus may spread by:    Breathing in droplets of fluid that someone has coughed or sneezed into the air.    Touching your eyes, nose or mouth after touching an infected surface. (The virus can live on handles, objects and other surfaces.)  What are the symptoms of COVID-19?  Symptoms may appear 2 to 14 days after contact with the virus. They can include:    Fever    Dry cough    Trouble breathing    Other flu-like symptoms  Many people have no symptoms or only mild symptoms.  In some cases, this virus can cause infection (pneumonia) in both lungs. This can make a person very ill, and it can even cause death.  Am I at risk?  You are at risk for getting COVID-19 if:    You live in (or recently traveled to) an area with a COVID-19 outbreak    You had contact with a sick person who recently traveled to an area with an outbreak    You had contact with someone who has or may have COVID-19  Your chances of severe illness are higher if:    You are an older adult    You have heart or lung disease    You have diabetes or another serious health issue  How is COVID-19 diagnosed?  Your care team will ask about your symptoms, recent travel and contact with sick people.     Not everyone will be tested for the virus. If you need to be tested, we will use a cotton swab to take a sample of mucus from your nose or throat. Or, we may collect  "mucus that you have coughed up from your lungs (sputum).  How is COVID-19 treated?  At this time, there is no medicine to treat COVID-19. If you get sick, there are things you can do to help your body fight the virus. These may include:    Medicine (acetaminophen) to help ease pain and reduce fever.    Bed rest to help your body fight the illness.  If you are very sick, you may need to stay in the hospital. We may give you fluids through a vein to keep you hydrated (IV fluids). To make sure your body gets enough oxygen, we may give you an oxygen mask or a breathing machine (ventilator).  How can I protect myself and others from COVID-19?  Be prepared. Try to keep a 2-week supply of medicines, food and other household items. Plan who will care for you, your children and pets if you get sick. And, stay informed about COVID-19 in your area.  There is no vaccine yet for COVID-19. To protect yourself and others:    Wash your hands often. Use soap and clean, running water for at least 20 seconds.    If you can t use soap and water, use hand . Make sure it has at least 60% alcohol.    Don't touch your eyes, nose or mouth unless you have clean hands.    Try to avoid \"high-touch\" public surfaces, like doorknobs. Don't shake hands.    Clean home and work surfaces often with disinfectant.    Cough or sneeze into a tissue, then throw the tissue into the trash. (If you don't have tissues, cough or sneeze into the bend of your elbow.) Wash your hands after coughing or sneezing.    Don t share food or household items, like eating and drinking utensils.    Stay about 6 feet away from others as much you can. This is called  social distancing.     Stay away from people who are sick.    Try to avoid areas that have a COVID-19 outbreak.  For the latest travel alerts, visit the CDC website at: www.cdc.gov/coronavirus/2019-ncov/travelers   If you ve been in an area with COVID-19 in the last 14 days:    You may need to stay home " or limit your activities for up to 14 days. Call your care team for instructions.    Check for fever. Take your temperature every morning and evening for at least 14 days. Keep a record of the readings.    Stay home if you are sick for any reason.    You do not need to wear a face mask unless you are sick.    Watch for symptoms of the virus.  If you have COVID-19 (or symptoms of COVID-19):    Stay home and contact your care team. We will tell you what to do.    Don t leave home unless you need to get medical care. Don't go to work, school or public areas. Don't use buses, taxis or other public transportation.    Wash your hands often.    Stay away from other people in your home. Limit visitors. No kissing. No sharing household items.    Clean any surfaces you touch with disinfectant.    Cough or sneeze into a tissue, throw the tissue in the trash, then wash your hands. If you don't have tissues, cough or sneeze into the bend of your elbow.    Wear a face mask to protect others from your germs. If you can t wear a mask, your caregivers should wear one.    If you need to go to the hospital or clinic, call ahead to let them know. Expect the care team to wear masks, gowns, gloves and eye protection. You may be put in a separate room.    Follow all instructions from your care team.    Don t panic. Keep in mind that other illnesses can cause similar symptoms.  If you are caring for a sick person:    Follow all instructions from the care team.    Wash your hands often.    Wear protective clothing as advised.    Make sure the sick person wears a face mask. If they can't wear a mask, don't stay in the same room with the person. If you must be in the same room, wear a face mask.    Keep track of the sick person s symptoms.    Clean surfaces, fabrics and laundry well and often.    Keep other people and pets away from the sick person.  Should I worry about my pets?  At this time, there are no reports of pets getting sick with  COVID-19 or spreading the virus that causes it. Until we know more, be sure to:    Wash your hands after touching any animals.    Don't touch animals that may be sick.    Keep pets away from sick people.    Limit your contact with pets and animals if you are sick.  When to contact your care team    Before coming to the hospital or clinic    If you have symptoms of COVID-19 and have recently been in an area with an outbreak    If you have COVID-19 and your symptoms are worse   This information has been modified by your health care provider with permission from the publisher.  For informational purposes only. Not to replace the advice of your health care provider. Copyright   2020 [facility]. All rights reserved.

## 2020-10-02 NOTE — PROGRESS NOTES
SUBJECTIVE:   Colby Trent is a 60 year old male presenting with a chief complaint of   Chief Complaint   Patient presents with     Urgent Care     cough, running stuffy nose and fever for 1+ days - request covid test.       He is an established patient of [facility].  Patient presents with nasal congestion, coughing nonproductive x 4 days.  Pain behind eyes.  Patent requiring a note to return to work as well as a covid test.  +smoker.    PMHx; medications, allergies, social, and surgeries reviewed.      Review of Systems   Constitutional: Negative for appetite change, fatigue and fever.   HENT: Positive for congestion and rhinorrhea. Negative for ear pain.    Respiratory: Positive for cough. Negative for shortness of breath.    Cardiovascular: Negative for chest pain.   Gastrointestinal: Negative.    Genitourinary: Negative.    Neurological: Positive for headaches. Negative for dizziness.   All other systems reviewed and are negative.      Past Medical History:   Diagnosis Date     Bipolar disorder (H)      Family History   Problem Relation Age of Onset     Diabetes Father      Gastrointestinal Disease Father         multiple precancerous polyps     Cardiovascular Mother         cardiomyopathy     Depression Mother      Cancer - colorectal Maternal Uncle      Current Outpatient Medications   Medication Sig Dispense Refill     lamoTRIgine (LAMICTAL) 200 MG tablet TAKE 1 TABLET BY MOUTH EVERY DAY 90 tablet 0     Multiple Vitamin (MULTIVITAMIN PO) Take 1 tablet by mouth daily       VITAMIN D, CHOLECALCIFEROL, PO Take 2,000 Units by mouth daily       Social History     Tobacco Use     Smoking status: Current Every Day Smoker     Packs/day: 1.00     Types: Cigarettes     Smokeless tobacco: Never Used     Tobacco comment: 1/2 PPD   Substance Use Topics     Alcohol use: No       OBJECTIVE  /71 (BP Location: Left arm)   Pulse 76   Temp 99.2  F (37.3  C) (Oral)   Resp 14   SpO2 98%     Physical Exam  Vitals  signs and nursing note reviewed.   Constitutional:       General: He is not in acute distress.     Appearance: Normal appearance. He is normal weight. He is not ill-appearing, toxic-appearing or diaphoretic.   HENT:      Head: Normocephalic and atraumatic.      Right Ear: Ear canal and external ear normal. There is impacted cerumen.      Left Ear: Tympanic membrane, ear canal and external ear normal.      Mouth/Throat:      Mouth: Mucous membranes are moist.      Pharynx: Oropharynx is clear.   Eyes:      Extraocular Movements: Extraocular movements intact.      Conjunctiva/sclera: Conjunctivae normal.   Neck:      Musculoskeletal: Normal range of motion and neck supple.   Cardiovascular:      Rate and Rhythm: Normal rate and regular rhythm.      Pulses: Normal pulses.      Heart sounds: Normal heart sounds.   Pulmonary:      Effort: Pulmonary effort is normal. No respiratory distress.      Breath sounds: Normal breath sounds. No stridor. No wheezing, rhonchi or rales.   Skin:     General: Skin is warm and dry.      Capillary Refill: Capillary refill takes less than 2 seconds.   Neurological:      General: No focal deficit present.      Mental Status: He is alert.   Psychiatric:         Mood and Affect: Mood normal.         Behavior: Behavior normal.         Labs:  No results found for this or any previous visit (from the past 24 hour(s)).    X-Ray was not done.    ASSESSMENT:    No diagnosis found.     Medical Decision Making:    Differential Diagnosis:  URI Adult/Peds:  Bronchitis-viral, Viral syndrome, Viral upper respiratory illness and covid    Serious Comorbid Conditions:  Adult:  as above    PLAN:    URI Adult:  symptomatic care.  COVID test pending, note for work.  Symptomatic care.  Discussed and recommended self isolation.      Followup:    If not improving or if condition worsens, follow up with your Primary Care Provider, If not improving or if conditions worsens over the next 12-24 hours, go to the  Emergency Department    Patient Instructions       Patient Education     Understanding COVID-19 (Coronavirus Disease 2019)  COVID-19 is an illness of the lungs. It causes fever, coughing and trouble breathing. The illness is caused by a new virus in the coronavirus family called SARS-CoV-2.  First seen in late 2019, this virus has spread to many cities and countries around the world. It seems to spread and infect people fairly easily. Scientists are working to understand it better.  For the latest information, visit the CDC website at www.cdc.gov/coronavirus/2019-ncov.html Or call 620-QRI-JAYU ([phone]).   How does COVID-19 spread?  The virus may spread by:    Breathing in droplets of fluid that someone has coughed or sneezed into the air.    Touching your eyes, nose or mouth after touching an infected surface. (The virus can live on handles, objects and other surfaces.)  What are the symptoms of COVID-19?  Symptoms may appear 2 to 14 days after contact with the virus. They can include:    Fever    Dry cough    Trouble breathing    Other flu-like symptoms  Many people have no symptoms or only mild symptoms.  In some cases, this virus can cause infection (pneumonia) in both lungs. This can make a person very ill, and it can even cause death.  Am I at risk?  You are at risk for getting COVID-19 if:    You live in (or recently traveled to) an area with a COVID-19 outbreak    You had contact with a sick person who recently traveled to an area with an outbreak    You had contact with someone who has or may have COVID-19  Your chances of severe illness are higher if:    You are an older adult    You have heart or lung disease    You have diabetes or another serious health issue  How is COVID-19 diagnosed?  Your care team will ask about your symptoms, recent travel and contact with sick people.     Not everyone will be tested for the virus. If you need to be tested, we will use a cotton swab to take a sample of mucus  "from your nose or throat. Or, we may collect mucus that you have coughed up from your lungs (sputum).  How is COVID-19 treated?  At this time, there is no medicine to treat COVID-19. If you get sick, there are things you can do to help your body fight the virus. These may include:    Medicine (acetaminophen) to help ease pain and reduce fever.    Bed rest to help your body fight the illness.  If you are very sick, you may need to stay in the hospital. We may give you fluids through a vein to keep you hydrated (IV fluids). To make sure your body gets enough oxygen, we may give you an oxygen mask or a breathing machine (ventilator).  How can I protect myself and others from COVID-19?  Be prepared. Try to keep a 2-week supply of medicines, food and other household items. Plan who will care for you, your children and pets if you get sick. And, stay informed about COVID-19 in your area.  There is no vaccine yet for COVID-19. To protect yourself and others:    Wash your hands often. Use soap and clean, running water for at least 20 seconds.    If you can t use soap and water, use hand . Make sure it has at least 60% alcohol.    Don't touch your eyes, nose or mouth unless you have clean hands.    Try to avoid \"high-touch\" public surfaces, like doorknobs. Don't shake hands.    Clean home and work surfaces often with disinfectant.    Cough or sneeze into a tissue, then throw the tissue into the trash. (If you don't have tissues, cough or sneeze into the bend of your elbow.) Wash your hands after coughing or sneezing.    Don t share food or household items, like eating and drinking utensils.    Stay about 6 feet away from others as much you can. This is called  social distancing.     Stay away from people who are sick.    Try to avoid areas that have a COVID-19 outbreak.  For the latest travel alerts, visit the CDC website at: www.cdc.gov/coronavirus/2019-ncov/travelers   If you ve been in an area with COVID-19 in " the last 14 days:    You may need to stay home or limit your activities for up to 14 days. Call your care team for instructions.    Check for fever. Take your temperature every morning and evening for at least 14 days. Keep a record of the readings.    Stay home if you are sick for any reason.    You do not need to wear a face mask unless you are sick.    Watch for symptoms of the virus.  If you have COVID-19 (or symptoms of COVID-19):    Stay home and contact your care team. We will tell you what to do.    Don t leave home unless you need to get medical care. Don't go to work, school or public areas. Don't use buses, taxis or other public transportation.    Wash your hands often.    Stay away from other people in your home. Limit visitors. No kissing. No sharing household items.    Clean any surfaces you touch with disinfectant.    Cough or sneeze into a tissue, throw the tissue in the trash, then wash your hands. If you don't have tissues, cough or sneeze into the bend of your elbow.    Wear a face mask to protect others from your germs. If you can t wear a mask, your caregivers should wear one.    If you need to go to the hospital or clinic, call ahead to let them know. Expect the care team to wear masks, gowns, gloves and eye protection. You may be put in a separate room.    Follow all instructions from your care team.    Don t panic. Keep in mind that other illnesses can cause similar symptoms.  If you are caring for a sick person:    Follow all instructions from the care team.    Wash your hands often.    Wear protective clothing as advised.    Make sure the sick person wears a face mask. If they can't wear a mask, don't stay in the same room with the person. If you must be in the same room, wear a face mask.    Keep track of the sick person s symptoms.    Clean surfaces, fabrics and laundry well and often.    Keep other people and pets away from the sick person.  Should I worry about my pets?  At this time,  there are no reports of pets getting sick with COVID-19 or spreading the virus that causes it. Until we know more, be sure to:    Wash your hands after touching any animals.    Don't touch animals that may be sick.    Keep pets away from sick people.    Limit your contact with pets and animals if you are sick.  When to contact your care team    Before coming to the hospital or clinic    If you have symptoms of COVID-19 and have recently been in an area with an outbreak    If you have COVID-19 and your symptoms are worse   This information has been modified by your health care provider with permission from the publisher.  For informational purposes only. Not to replace the advice of your health care provider. Copyright   2020 [facility]. All rights reserved.

## 2020-10-02 NOTE — LETTER
October 2, 2020      Colby Trent  [address]  [facility] [address]        To Whom It May Concern:    Colby Trent was seen in our clinic. He may return to work on 10/12/2020 with the following:  No fever for 10 days without the use of tylenol or motrin, improvement in symptoms.  COVID test pending.      Sincerely,        Dayna Sainz PA-C

## 2020-10-03 LAB
SARS-COV-2 RNA SPEC QL NAA+PROBE: NOT DETECTED
SPECIMEN SOURCE: NORMAL

## 2021-01-14 ENCOUNTER — HEALTH MAINTENANCE LETTER (OUTPATIENT)
Age: 61
End: 2021-01-14

## 2021-10-24 ENCOUNTER — HEALTH MAINTENANCE LETTER (OUTPATIENT)
Age: 61
End: 2021-10-24

## 2022-02-13 ENCOUNTER — HEALTH MAINTENANCE LETTER (OUTPATIENT)
Age: 62
End: 2022-02-13

## 2022-05-17 ENCOUNTER — OFFICE VISIT (OUTPATIENT)
Dept: INTERNAL MEDICINE | Facility: CLINIC | Age: 62
End: 2022-05-17
Payer: COMMERCIAL

## 2022-05-17 VITALS
WEIGHT: 166.8 LBS | BODY MASS INDEX: 22.59 KG/M2 | HEIGHT: 72 IN | RESPIRATION RATE: 16 BRPM | OXYGEN SATURATION: 99 % | SYSTOLIC BLOOD PRESSURE: 100 MMHG | HEART RATE: 74 BPM | TEMPERATURE: 97.8 F | DIASTOLIC BLOOD PRESSURE: 70 MMHG

## 2022-05-17 DIAGNOSIS — Z11.4 SCREENING FOR HIV (HUMAN IMMUNODEFICIENCY VIRUS): ICD-10-CM

## 2022-05-17 DIAGNOSIS — Z12.11 COLON CANCER SCREENING: ICD-10-CM

## 2022-05-17 DIAGNOSIS — Z13.6 CARDIOVASCULAR SCREENING; LDL GOAL LESS THAN 130: ICD-10-CM

## 2022-05-17 DIAGNOSIS — Z00.00 ROUTINE GENERAL MEDICAL EXAMINATION AT A HEALTH CARE FACILITY: Primary | ICD-10-CM

## 2022-05-17 DIAGNOSIS — I82.431 ACUTE DEEP VEIN THROMBOSIS (DVT) OF POPLITEAL VEIN OF RIGHT LOWER EXTREMITY (H): ICD-10-CM

## 2022-05-17 DIAGNOSIS — Z11.59 NEED FOR HEPATITIS C SCREENING TEST: ICD-10-CM

## 2022-05-17 DIAGNOSIS — Z12.5 SCREENING PSA (PROSTATE SPECIFIC ANTIGEN): ICD-10-CM

## 2022-05-17 DIAGNOSIS — Z72.0 TOBACCO ABUSE: ICD-10-CM

## 2022-05-17 DIAGNOSIS — F31.9 BIPOLAR AFFECTIVE DISORDER, REMISSION STATUS UNSPECIFIED (H): ICD-10-CM

## 2022-05-17 DIAGNOSIS — K30 UPSET STOMACH: ICD-10-CM

## 2022-05-17 LAB
ERYTHROCYTE [DISTWIDTH] IN BLOOD BY AUTOMATED COUNT: 12.6 % (ref 10–15)
HCT VFR BLD AUTO: 44.6 % (ref 40–53)
HCV AB SERPL QL IA: NONREACTIVE
HGB BLD-MCNC: 14.3 G/DL (ref 13.3–17.7)
HIV 1+2 AB+HIV1 P24 AG SERPL QL IA: NONREACTIVE
MCH RBC QN AUTO: 31.6 PG (ref 26.5–33)
MCHC RBC AUTO-ENTMCNC: 32.1 G/DL (ref 31.5–36.5)
MCV RBC AUTO: 99 FL (ref 78–100)
PLATELET # BLD AUTO: 288 10E3/UL (ref 150–450)
RBC # BLD AUTO: 4.52 10E6/UL (ref 4.4–5.9)
WBC # BLD AUTO: 7.9 10E3/UL (ref 4–11)

## 2022-05-17 PROCEDURE — 86803 HEPATITIS C AB TEST: CPT | Performed by: INTERNAL MEDICINE

## 2022-05-17 PROCEDURE — 85027 COMPLETE CBC AUTOMATED: CPT | Performed by: INTERNAL MEDICINE

## 2022-05-17 PROCEDURE — G0103 PSA SCREENING: HCPCS | Performed by: INTERNAL MEDICINE

## 2022-05-17 PROCEDURE — 99213 OFFICE O/P EST LOW 20 MIN: CPT | Mod: 25 | Performed by: INTERNAL MEDICINE

## 2022-05-17 PROCEDURE — 80053 COMPREHEN METABOLIC PANEL: CPT | Performed by: INTERNAL MEDICINE

## 2022-05-17 PROCEDURE — 99396 PREV VISIT EST AGE 40-64: CPT | Performed by: INTERNAL MEDICINE

## 2022-05-17 PROCEDURE — 87389 HIV-1 AG W/HIV-1&-2 AB AG IA: CPT | Performed by: INTERNAL MEDICINE

## 2022-05-17 PROCEDURE — 36415 COLL VENOUS BLD VENIPUNCTURE: CPT | Performed by: INTERNAL MEDICINE

## 2022-05-17 PROCEDURE — 80061 LIPID PANEL: CPT | Performed by: INTERNAL MEDICINE

## 2022-05-17 RX ORDER — CLOBETASOL PROPIONATE 0.5 MG/G
CREAM TOPICAL
COMMUNITY
Start: 2021-10-19

## 2022-05-17 RX ORDER — PANTOPRAZOLE SODIUM 40 MG/1
40 TABLET, DELAYED RELEASE ORAL DAILY
Qty: 90 TABLET | Refills: 0 | Status: SHIPPED | OUTPATIENT
Start: 2022-05-17 | End: 2022-09-13

## 2022-05-17 ASSESSMENT — ENCOUNTER SYMPTOMS
DIZZINESS: 0
DIARRHEA: 0
CONSTIPATION: 1
NAUSEA: 0
HEMATURIA: 0
SORE THROAT: 0
PARESTHESIAS: 0
CHILLS: 0
ABDOMINAL PAIN: 1
NERVOUS/ANXIOUS: 0
ARTHRALGIAS: 0
MYALGIAS: 0
DYSURIA: 0
FREQUENCY: 1
HEADACHES: 0
COUGH: 1
EYE PAIN: 0
JOINT SWELLING: 0
FEVER: 0
WEAKNESS: 0
SHORTNESS OF BREATH: 0
HEARTBURN: 0
HEMATOCHEZIA: 0
PALPITATIONS: 0

## 2022-05-17 NOTE — PROGRESS NOTES
SUBJECTIVE:   CC: Colby Trent is an 61 year old male who presents for preventative health visit.     Patient has been advised of split billing requirements and indicates understanding: Yes  Healthy Habits:     Getting at least 3 servings of Calcium per day:  Yes    Bi-annual eye exam:  Yes    Dental care twice a year:  Yes    Sleep apnea or symptoms of sleep apnea:  None    Diet:  Regular (no restrictions)    Frequency of exercise:  2-3 days/week    Duration of exercise:  15-30 minutes    Taking medications regularly:  Yes    Medication side effects:  None    PHQ-2 Total Score: 0    Additional concerns today:  No      Today's PHQ-2 Score:   PHQ-2 ( 1999 Pfizer) 4/13/2020   Q1: Little interest or pleasure in doing things 0   Q2: Feeling down, depressed or hopeless 0   PHQ-2 Score 0   PHQ-2 Total Score (12-17 Years)- Positive if 3 or more points; Administer PHQ-A if positive 0       Abuse: Current or Past(Physical, Sexual or Emotional)- No  Do you feel safe in your environment? Yes    Have you ever done Advance Care Planning? (For example, a Health Directive, POLST, or a discussion with a medical provider or your loved ones about your wishes): No, advance care planning information given to patient to review.  Patient declined advance care planning discussion at this time.    Social History     Tobacco Use     Smoking status: Current Every Day Smoker     Packs/day: 1.00     Types: Cigarettes     Smokeless tobacco: Never Used     Tobacco comment: 1/2 PPD   Substance Use Topics     Alcohol use: No       Alcohol Use 9/3/2015   Prescreen: >3 drinks/day or >7 drinks/week? The patient does not drink >3 drinks per day nor >7 drinks per week.       Last PSA:   PSA   Date Value Ref Range Status   09/03/2015 0.20 0 - 4 ug/L Final     Reviewed orders with patient. Reviewed health maintenance and updated orders accordingly - Yes    Reviewed and updated as needed this visit by clinical staff                    Reviewed and  updated as needed this visit by Provider                   Current Outpatient Medications   Medication     clobetasol (TEMOVATE) 0.05 % external cream     Multiple Vitamin (MULTIVITAMIN PO)     pantoprazole (PROTONIX) 40 MG EC tablet     VITAMIN D, CHOLECALCIFEROL, PO     No current facility-administered medications for this visit.       Review of Systems  CONSTITUTIONAL: NEGATIVE for fever, chills, change in weight  INTEGUMENTARY/SKIN: NEGATIVE for worrisome rashes, moles or lesions  EYES: NEGATIVE for vision changes or irritation  ENT: NEGATIVE for ear, mouth and throat problems  RESP: NEGATIVE for significant cough or SOB  CV: NEGATIVE for chest pain, palpitations or peripheral edema  GI: reports upset stomach.  His symptoms are very nonspecific.  He does not report food intolerance.  He reports his symptoms are more above his bellybutton.  There is intermittent nausea.  He does not use excessive alcohol.  He continues to smoke.  He denies any back pain.  He denies any vomiting.   male: negative for dysuria, hematuria, decreased urinary stream, erectile dysfunction, urethral discharge  MUSCULOSKELETAL: NEGATIVE for significant arthralgias or myalgia  NEURO: NEGATIVE for weakness, dizziness or paresthesias  PSYCHIATRIC: NEGATIVE for changes in mood or affect    OBJECTIVE:   /70 (BP Location: Left arm, Patient Position: Chair, Cuff Size: Adult Regular)   Pulse 74   Temp 97.8  F (36.6  C) (Oral)   Resp 16   Ht 1.829 m (6')   Wt 75.7 kg (166 lb 12.8 oz)   SpO2 99%   BMI 22.62 kg/m      Physical Exam  GENERAL: healthy, alert and no distress  EYES: Eyes grossly normal to inspection, PERRL and conjunctivae and sclerae normal  HENT: ear canals and TM's normal, nose and mouth without ulcers or lesions  NECK: no adenopathy, no asymmetry, masses, or scars and thyroid normal to palpation  RESP: lungs clear to auscultation - no rales, rhonchi or wheezes  CV: regular rate and rhythm, normal S1 S2, no S3 or S4,  no murmur, click or rub, no peripheral edema and peripheral pulses strong  ABDOMEN: soft, nontender, no hepatosplenomegaly, no masses and bowel sounds normal  RECTAL: normal sphincter tone, no rectal masses, prostate normal size, smooth, nontender without nodules or masses  MS: no gross musculoskeletal defects noted, no edema  NEURO: No focal changes  PSYCH: mentation appears normal, affect normal/bright      ASSESSMENT/PLAN:   (Z00.00) Routine general medical examination at a health care facility  (primary encounter diagnosis)  Comment: Advised updated COVID-vaccine, pneumonia vaccine and shingles vaccination and the patient has declined all.  Plan: CBC with platelets, Comprehensive metabolic         panel, Lipid Profile            (K30) Upset stomach  Comment: Nonspecific complaints.  Suggest trial of PPI as well as abdominal ultrasound.  Check liver function profile.  Plan: pantoprazole (PROTONIX) 40 MG EC tablet,         OFFICE/OUTPT VISIT,EST,LEVL IV, US Abdomen         Complete            (I82.431) Acute deep vein thrombosis (DVT) of popliteal vein of right lower extremity (H)  Comment: Noted prior history, resolved.  Plan:     (Z13.6) CARDIOVASCULAR SCREENING; LDL GOAL LESS THAN 130  Comment: Labs ordered as fasting per routine.  Plan: Lipid Profile            (F31.9) Bipolar affective disorder, remission status unspecified (H)  Comment: Patient has stopped his Lamictal on his own.  Follow-up mental health provider as needed  Plan:     (Z72.0) Tobacco abuse  Comment: Advise smoking cessation.  Discussed CT imaging, patient will discuss if covered by insurance  Plan:     (Z12.5) Screening PSA (prostate specific antigen)  Comment: Ordered as routine screening based on age  Plan: Prostate Specific Antigen Screen            (Z12.11) Colon cancer screening  Comment: ADVISED COLONOSCOPY FOR ROUTINE PREVENTATIVE CARE.  Plan: Fecal colorectal cancer screen FIT, Adult         Gastro Ref - Procedure Only         "    (Z11.4) Screening for HIV (human immunodeficiency virus)  Comment: Ordered as routine screening  Plan: HIV Antigen Antibody Combo            (Z11.59) Need for hepatitis C screening test  Comment: Did as routine screening  Plan: Hepatitis C Screen Reflex to HCV RNA Quant and         Genotype              Patient has been advised of split billing requirements and indicates understanding: Yes    COUNSELING:   Reviewed preventive health counseling, as reflected in patient instructions       Regular exercise       Healthy diet/nutrition    Estimated body mass index is 24.14 kg/m  as calculated from the following:    Height as of 1/12/20: 1.803 m (5' 11\").    Weight as of 1/22/20: 78.5 kg (173 lb 1.6 oz).         He reports that he has been smoking cigarettes. He has been smoking about 1.00 pack per day. He has never used smokeless tobacco.  Tobacco Cessation Action Plan:   Information offered: Patient not interested at this time      Counseling Resources:  ATP IV Guidelines  Pooled Cohorts Equation Calculator  FRAX Risk Assessment  ICSI Preventive Guidelines  Dietary Guidelines for Americans, 2010  USDA's MyPlate  ASA Prophylaxis  Lung CA Screening    Tyler Stone MD  [facility]  "

## 2022-05-18 LAB
ALBUMIN SERPL-MCNC: 3.8 G/DL (ref 3.4–5)
ALP SERPL-CCNC: 156 U/L (ref 40–150)
ALT SERPL W P-5'-P-CCNC: 29 U/L (ref 0–70)
ANION GAP SERPL CALCULATED.3IONS-SCNC: 6 MMOL/L (ref 3–14)
AST SERPL W P-5'-P-CCNC: 20 U/L (ref 0–45)
BILIRUB SERPL-MCNC: 0.5 MG/DL (ref 0.2–1.3)
BUN SERPL-MCNC: 25 MG/DL (ref 7–30)
CALCIUM SERPL-MCNC: 9.3 MG/DL (ref 8.5–10.1)
CHLORIDE BLD-SCNC: 111 MMOL/L (ref 94–109)
CHOLEST SERPL-MCNC: 226 MG/DL
CO2 SERPL-SCNC: 24 MMOL/L (ref 20–32)
CREAT SERPL-MCNC: 0.83 MG/DL (ref 0.66–1.25)
FASTING STATUS PATIENT QL REPORTED: YES
GFR SERPL CREATININE-BSD FRML MDRD: >90 ML/MIN/1.73M2
GLUCOSE BLD-MCNC: 97 MG/DL (ref 70–99)
HDLC SERPL-MCNC: 52 MG/DL
LDLC SERPL CALC-MCNC: 161 MG/DL
NONHDLC SERPL-MCNC: 174 MG/DL
POTASSIUM BLD-SCNC: 5 MMOL/L (ref 3.4–5.3)
PROT SERPL-MCNC: 7 G/DL (ref 6.8–8.8)
PSA SERPL-MCNC: 0.33 UG/L (ref 0–4)
SODIUM SERPL-SCNC: 141 MMOL/L (ref 133–144)
TRIGL SERPL-MCNC: 65 MG/DL

## 2022-05-24 PROCEDURE — 82274 ASSAY TEST FOR BLOOD FECAL: CPT | Performed by: INTERNAL MEDICINE

## 2022-05-26 ENCOUNTER — ANCILLARY PROCEDURE (OUTPATIENT)
Dept: ULTRASOUND IMAGING | Facility: CLINIC | Age: 62
End: 2022-05-26
Attending: INTERNAL MEDICINE
Payer: COMMERCIAL

## 2022-05-26 DIAGNOSIS — K30 UPSET STOMACH: ICD-10-CM

## 2022-05-26 LAB — HEMOCCULT STL QL IA: NEGATIVE

## 2022-05-26 PROCEDURE — 76700 US EXAM ABDOM COMPLETE: CPT

## 2022-09-13 ENCOUNTER — OFFICE VISIT (OUTPATIENT)
Dept: INTERNAL MEDICINE | Facility: CLINIC | Age: 62
End: 2022-09-13
Payer: COMMERCIAL

## 2022-09-13 VITALS
OXYGEN SATURATION: 98 % | HEART RATE: 72 BPM | SYSTOLIC BLOOD PRESSURE: 124 MMHG | DIASTOLIC BLOOD PRESSURE: 72 MMHG | TEMPERATURE: 97.1 F | BODY MASS INDEX: 22.96 KG/M2 | WEIGHT: 169.5 LBS | HEIGHT: 72 IN

## 2022-09-13 DIAGNOSIS — N28.1 RENAL CYST: Primary | ICD-10-CM

## 2022-09-13 DIAGNOSIS — Z12.11 COLON CANCER SCREENING: ICD-10-CM

## 2022-09-13 DIAGNOSIS — Z87.891 PERSONAL HISTORY OF TOBACCO USE, PRESENTING HAZARDS TO HEALTH: ICD-10-CM

## 2022-09-13 PROCEDURE — 99214 OFFICE O/P EST MOD 30 MIN: CPT | Performed by: INTERNAL MEDICINE

## 2022-09-13 NOTE — PROGRESS NOTES
Assessment & Plan     Renal cyst  Reviewed ultrasound results with patient.  Suggest MRI imaging for reassurance.  - MR Renal wo & w Contrast; Future    Personal history of tobacco use, presenting hazards to health  Discussed longstanding tobacco use and benefit of routine screening low-dose CT scan.  Patient will consider and schedule  - CT Chest Lung Cancer Screen Low Dose Without; Future    Colon cancer screening  ADVISED COLONOSCOPY FOR ROUTINE PREVENTATIVE CARE.  - Colonoscopy Screening  Referral; Future       See Patient Instructions    Return in about 1 month (around 10/13/2022) for diagnostic test as ordered.    Tyler Stone MD  [facility]    Teo Bowman is a 62 year old accompanied by his self, presenting for the following health issues:  Results    Jitendra is here today for follow-up.  He feels otherwise well.  He continues to smoke.    Is primarily here to discuss the results of his ultrasound demonstrating what appears to be a complex right-sided renal cyst.  His prior stomach issues that he had listed in the past have resolved.    He continues to smoke daily and has done so for roughly 40+ years at least 1 pack/day.    History of Present Illness       Reason for visit:  Follow up for ultrasound    He eats 2-3 servings of fruits and vegetables daily.He consumes 1 sweetened beverage(s) daily.He exercises with enough effort to increase his heart rate 9 or less minutes per day.  He exercises with enough effort to increase his heart rate 3 or less days per week.   He is taking medications regularly.       Review of Systems   CONSTITUTIONAL: NEGATIVE for fever, chills, change in weight  EYES: NEGATIVE for vision changes or irritation  ENT/MOUTH: NEGATIVE for ear, mouth and throat problems  RESP: NEGATIVE for significant cough or SOB  CV: NEGATIVE for chest pain, palpitations or peripheral edema  GI: NEGATIVE for nausea, abdominal pain, heartburn, or  change in bowel habits  : NEGATIVE for frequency, dysuria, or hematuria  MUSCULOSKELETAL: NEGATIVE for significant arthralgias or myalgia  NEURO: NEGATIVE for weakness, dizziness or paresthesias  HEME: NEGATIVE for bleeding problems  PSYCHIATRIC: NEGATIVE for changes in mood or affect    Current Outpatient Medications   Medication     clobetasol (TEMOVATE) 0.05 % external cream     Multiple Vitamin (MULTIVITAMIN PO)     No current facility-administered medications for this visit.         Objective    /72   Pulse 72   Temp 97.1  F (36.2  C) (Tympanic)   Ht 1.829 m (6')   Wt 76.9 kg (169 lb 8 oz)   SpO2 98%   BMI 22.99 kg/m    Body mass index is 22.99 kg/m .     Physical Exam   GENERAL: healthy, alert and no distress  EYES: Eyes grossly normal to inspection, PERRL and conjunctivae and sclerae normal  HENT: ear canals and TM's normal, nose and mouth without ulcers or lesions  RESP: lungs clear to auscultation - no rales, rhonchi or wheezes  CV: regular rate and rhythm, normal S1 S2, no S3 or S4, no murmur, click or rub  MS: no gross musculoskeletal defects noted  NEURO: No focal changes  PSYCH: mentation appears normal, affect normal/bright    IMPRESSION:  1.  No acute findings or specific abnormality to explain the patient's symptoms.  2.  Complex right renal cyst with multiple thin internal septations. Consider follow-up renal mass protocol MRI or CT in 6-12 months.

## 2022-10-10 ENCOUNTER — HEALTH MAINTENANCE LETTER (OUTPATIENT)
Age: 62
End: 2022-10-10

## 2023-01-08 ENCOUNTER — OFFICE VISIT (OUTPATIENT)
Dept: URGENT CARE | Facility: URGENT CARE | Age: 63
End: 2023-01-08
Payer: COMMERCIAL

## 2023-01-08 VITALS
HEART RATE: 79 BPM | TEMPERATURE: 97.9 F | BODY MASS INDEX: 22.92 KG/M2 | OXYGEN SATURATION: 95 % | SYSTOLIC BLOOD PRESSURE: 120 MMHG | DIASTOLIC BLOOD PRESSURE: 70 MMHG | WEIGHT: 169 LBS | RESPIRATION RATE: 16 BRPM

## 2023-01-08 DIAGNOSIS — R05.1 ACUTE COUGH: Primary | ICD-10-CM

## 2023-01-08 DIAGNOSIS — Z72.0 TOBACCO ABUSE: ICD-10-CM

## 2023-01-08 LAB
FLUAV AG SPEC QL IA: NEGATIVE
FLUBV AG SPEC QL IA: NEGATIVE

## 2023-01-08 PROCEDURE — 87804 INFLUENZA ASSAY W/OPTIC: CPT | Performed by: FAMILY MEDICINE

## 2023-01-08 PROCEDURE — 99213 OFFICE O/P EST LOW 20 MIN: CPT | Performed by: FAMILY MEDICINE

## 2023-01-08 PROCEDURE — U0005 INFEC AGEN DETEC AMPLI PROBE: HCPCS | Performed by: FAMILY MEDICINE

## 2023-01-08 PROCEDURE — U0003 INFECTIOUS AGENT DETECTION BY NUCLEIC ACID (DNA OR RNA); SEVERE ACUTE RESPIRATORY SYNDROME CORONAVIRUS 2 (SARS-COV-2) (CORONAVIRUS DISEASE [COVID-19]), AMPLIFIED PROBE TECHNIQUE, MAKING USE OF HIGH THROUGHPUT TECHNOLOGIES AS DESCRIBED BY CMS-2020-01-R: HCPCS | Performed by: FAMILY MEDICINE

## 2023-01-08 RX ORDER — BENZONATATE 200 MG/1
200 CAPSULE ORAL 3 TIMES DAILY PRN
Qty: 21 CAPSULE | Refills: 0 | Status: SHIPPED | OUTPATIENT
Start: 2023-01-08 | End: 2023-01-15

## 2023-01-08 RX ORDER — RISANKIZUMAB-RZAA 150 MG/ML
INJECTION SUBCUTANEOUS
COMMUNITY
Start: 2022-11-08

## 2023-01-08 NOTE — PROGRESS NOTES
SUBJECTIVE: Colby Trent is a 62 year old male presenting with a chief complaint of cough .  Onset of symptoms was 3 day(s) ago.  Predisposing factors include tobacco use.    Past Medical History:   Diagnosis Date     Bipolar disorder (H)      Allergies   Allergen Reactions     No Known Drug Allergies      Social History     Tobacco Use     Smoking status: Every Day     Packs/day: 1.00     Years: 40.00     Pack years: 40.00     Types: Cigarettes     Smokeless tobacco: Never     Tobacco comments:     1/2 PPD   Substance Use Topics     Alcohol use: No       ROS:  SKIN: no rash  GI: no vomiting    OBJECTIVE:  /70   Pulse 79   Temp 97.9  F (36.6  C) (Tympanic)   Resp 16   Wt 76.7 kg (169 lb)   SpO2 95%   BMI 22.92 kg/m  GENERAL APPEARANCE: healthy, alert and no distress  EYES: EOMI,  PERRL, conjunctiva clear  HENT: ear canals and TM's normal.  Nose and mouth without ulcers, erythema or lesions  RESP: lungs clear to auscultation - no rales, rhonchi or wheezes  SKIN: no suspicious lesions or rashes      ICD-10-CM    1. Acute cough  R05.1 Influenza A/B antigen     Symptomatic COVID-19 Virus (Coronavirus) by PCR Nose     benzonatate (TESSALON) 200 MG capsule      2. Tobacco abuse  Z72.0           Fluids/Rest, f/u if worse/not any better

## 2023-01-09 ENCOUNTER — TELEPHONE (OUTPATIENT)
Dept: NURSING | Facility: CLINIC | Age: 63
End: 2023-01-09

## 2023-01-09 ENCOUNTER — VIRTUAL VISIT (OUTPATIENT)
Dept: INTERNAL MEDICINE | Facility: CLINIC | Age: 63
End: 2023-01-09
Payer: COMMERCIAL

## 2023-01-09 DIAGNOSIS — Z72.0 TOBACCO ABUSE: ICD-10-CM

## 2023-01-09 DIAGNOSIS — U07.1 INFECTION DUE TO 2019 NOVEL CORONAVIRUS: Primary | ICD-10-CM

## 2023-01-09 DIAGNOSIS — F31.9 BIPOLAR AFFECTIVE DISORDER, REMISSION STATUS UNSPECIFIED (H): ICD-10-CM

## 2023-01-09 LAB — SARS-COV-2 RNA RESP QL NAA+PROBE: POSITIVE

## 2023-01-09 PROCEDURE — 99213 OFFICE O/P EST LOW 20 MIN: CPT | Mod: 95 | Performed by: PHYSICIAN ASSISTANT

## 2023-01-09 RX ORDER — COVID-19 ANTIGEN TEST
1 KIT MISCELLANEOUS PRN
Qty: 2 KIT | Refills: 1 | Status: SHIPPED | OUTPATIENT
Start: 2023-01-09

## 2023-01-09 NOTE — PATIENT INSTRUCTIONS
COVID-19 Outpatient Treatments  Your care team can help you find the best treatments for COVID-19. Talk to a health care provider or refer to the FDA medicine fact sheets below.    Important: You can't have Paxlovid or molnupiravir if you're starting the medicine more than 5 days after your symptoms have started.  Paxlovid: https://www.fda.gov/media/979569/download  Molnupiravir (Lagevrio): https://www.fda.gov/media/158366/download  Paxlovid (nimatrelvir and ritonavir)  How it works  Two medicines (nirmatrelvir and ritonavir) are taken together. They stop the virus from growing. Less amount of virus is easier for your body to fight.  Benefits  Lowers risk of a hospital stay or death from COVID-19.  How to take    Medicine comes in a daily container with both medicine tablets. Take by mouth twice daily (once in the morning, once at night) for 5 days.    The number of tablets to take varies by patient.    Don't chew or break capsules. Swallow whole.  When to take  Take as soon as possible after positive COVID-19 test result, and within 5 days of your first symptoms.  Who can take it  Patients must be 12 years or older, weigh at least 88 pounds, and have tested positive for COVID-19. Paxlovid is the preferred treatment for pregnant patients.  Possible side effects  Can cause altered sense of taste, diarrhea (loose, watery stools), high blood pressure, muscle aches.  Medicine conflicts    Some medicines may conflict with Paxlovid and may cause serious side effects.    Tell your care team about all the medicines you take, including prescription and over-the-counter medicines, vitamins, and herbal supplements.    Your care team will review your medicines to make sure that you can safely take Paxlovid.  Cautions    Paxlovid is not advised for patients with severe kidney or liver disease. If you have kidney or liver problems, the dose may need to be changed.    If you're pregnant or breastfeeding, talk to your care team  about your options.    If you take hormonal birth control (such as the Pill), then you or your partner should also use a non-hormonal form of birth control (such as a condom). Keep doing this for 1 menstrual cycle after your last dose of Paxlovid.  Molnupiravir (Lagevrio)  How it works  Stops the virus from growing. Less amount of virus is easier for your body to fight.  Benefits  Lowers risk of a hospital stay or death from COVID-19.  How to take  Take 4 capsules by mouth every 12 hours (4 in the morning and 4 at night) for 5 days. Don't chew or break capsules. Swallow whole.  When to take  Take as soon as possible after positive COVID-19 test result, and within 5 days of your first symptoms.  Who can take it  Patients must be 18 years or older and have tested positive for COVID-19.  Possible side effects  Diarrhea (loose, watery stools), nausea (feeling sick to your stomach), dizziness, headaches.  Medicine conflicts  Right now, there are no known conflicts with other drugs. But tell your care team about all medicines you take.  Cautions    This medicine is not advised for patients who are pregnant.    If you are someone who could become pregnant, use trusted birth control until 4 days after your last dose of molnupiravir.    If your partner could become pregnant, you should use trusted birth control until 3 months after your last dose of molnupiravir.  For informational purposes only. Not to replace the advice of your health care provider. Copyright   2022 [facility]. All rights reserved. Clinically reviewed by Jemima Pfeiffer, PharmD, BCACP. [employer] [id number] - REV 12/22.

## 2023-01-09 NOTE — PROGRESS NOTES
Colby is a 62 year old who is being evaluated via a billable telephone visit.      What phone number would you like to be contacted at? [phone]  How would you like to obtain your AVS? Chuckt    Distant Location (provider location):  Off-site    Assessment & Plan     Infection due to 2019 novel coronavirus    - nirmatrelvir and ritonavir (PAXLOVID) therapy pack; Take 3 tablets by mouth 2 times daily for 5 days (Take 2 Nirmatrelvir tablets and 1 Ritonavir tablet twice daily for 5 days)  - COVID-19 At Home Antigen Test KIT; 1 each by In Vitro route as needed (covid 19 symptoms)    Tobacco abuse      Bipolar affective disorder, remission status unspecified (H)                 COVID-19 positive patient.  Encounter for consideration of medication intervention. Patient does qualify for a prescription. Full discussion with patient including medication options, risks and benefits. Potential drug interactions reviewed with patient.     Treatment Planned paxlovid    Temporary change to home medications:  None     Estimated body mass index is 22.92 kg/m  as calculated from the following:    Height as of 9/13/22: 1.829 m (6').    Weight as of 1/8/23: 76.7 kg (169 lb).  GFR Estimate   Date Value Ref Range Status   05/17/2022 >90 >60 mL/min/1.73m2 Final     Comment:     Effective December 21, 2021 eGFRcr in adults is calculated using the 2021 CKD-EPI creatinine equation which includes age and gender (Dony et al., NEJM, DOI: 10.1056/OVTDhk1736005)   09/03/2015 74 >60 mL/min/1.7m2 Final     Comment:     Non  GFR Calc     Lab Results   Component Value Date    [id number] Positive (A) 01/08/2023       No follow-ups on file.    Greer Garibay PA-C  [facility]   Colby is a 62 year old, presenting for the following health issues:  Covid treatment       HPI       COVID-19 Symptom Review  How many days ago did these symptoms start? 01/05/2023    Are any of the  following symptoms significant for you?    New or worsening difficulty breathing? No    Worsening cough? Yes, I am coughing up mucus.    Fever or chills? Yes, I felt feverish or had chills.    Headache: YES    Sore throat: No    Chest pain: No    Diarrhea: No    Body aches? YES    What treatments has patient tried? Ibuprofen   Does patient live in a nursing home, group home, or shelter? No  Does patient have a way to get food/medications during quarantined? Yes, I have a friend or family member who can help me.                  Review of Systems         Objective           Vitals:  No vitals were obtained today due to virtual visit.    Physical Exam   healthy, alert and no distress  PSYCH: Alert and oriented times 3; coherent speech, normal   rate and volume, able to articulate logical thoughts, able   to abstract reason, no tangential thoughts, no hallucinations   or delusions  His affect is normal  RESP: No cough, no audible wheezing, able to talk in full sentences  Remainder of exam unable to be completed due to telephone visits    Office Visit on 01/08/2023   Component Date Value Ref Range Status     Influenza A antigen 01/08/2023 Negative  Negative Final     Influenza B antigen 01/08/2023 Negative  Negative Final     SARS CoV2 PCR 01/08/2023 Positive (A)  Negative Final    POSITIVE: SARS-CoV-2 (COVID-19) RNA detected, presumed positive.               Phone call duration: 9 minutes

## 2023-01-09 NOTE — TELEPHONE ENCOUNTER
Coronavirus (COVID-19) Notification    Caller Name (Patient, parent, daughter/son, grandparent, etc)  Patient    Reason for call  Notify of Positive Coronavirus (COVID-19) lab results, assess symptoms,  review [facility] recommendations    Lab Result    Lab test:  2019-nCoV rRt-PCR or SARS-CoV-2 PCR    Oropharyngeal AND/OR nasopharyngeal swabs is POSITIVE for 2019-nCoV RNA/SARS-COV-2 PCR (COVID-19 virus)      Gather patient reported symptoms   Assessment   Current Symptoms at time of phone call, reported by patient: (if no symptoms, document: No symptoms] Sinus congestion , body aches, not hungry   Date of symptom(s) onset (if applicable) 01/05/23     If at time of call, Patients symptoms have worsened, the Patient should contact 911 or have someone drive them to Emergency Dept promptly:      If Patient calling 911, inform 911 personal that you have tested positive for the Coronavirus (COVID-19).  Place mask on and await 911 to arrive.    If Emergency Dept, If possible, please have another adult drive you to the Emergency Dept but you need to wear mask when in contact with other people.      Treatment Options:   Patient classified as COVID treatment eligible by Epic high risk algorithm: Yes  Is the patient symptomatic at the time of result notification? Yes. Was the onset of symptoms within the last 5 days? Yes.   There are now oral medications available for the treatment of COVID-19.  Taking one of these medications within the first five days of symptoms (when people may not yet feel severely ill) has been shown to make people feel better, prevent them from getting sicker, and preventing hospitalization and death.   Does the patient agree to have a visit with a provider to discuss treatment options? Yes. Is the patient seen at [facility]?  No: Warm transfer caller to [phone] to be scheduled with a virtual urgent provider.  During transfer, instruct  on appropriate time frame for  visit     Review information with Patient    Your result was positive. This means you have COVID-19 (coronavirus).    How can I protect others?    These guidelines are for isolating before returning to work, school or .    If you DO have symptoms    Stay home and away from others     For at least 5 days after your symptoms started, AND    You are fever free for 24 hours (with no medicine that reduces fever), AND    Your other symptoms are better    Wear a mask for 10 full days anytime you are around others    If you DON'T have symptoms    Stay home and away from others for at least 5 days after your positive test    Wear a mask for 10 full days anytime you are around others    There may be different guidelines for healthcare facilities.  Please check with the specific sites before arriving.    If you have been told by a doctor that you were severely ill with COVID-19 or are immunocompromised, you should isolate for at least 10 days.    You should not go back to work until you meet the guidelines above for ending your home isolation. You don't need to be retested for COVID-19 before going back to work--studies show that you won't spread the virus if it's been at least 10 days since your symptoms started (or 20 days, if you have a weak immune system).    Employers, schools, and daycares: This is an official notice for this person's medical guidelines for returning in-person.  They must meet the above guidelines before going back to work, school or  in person.    You will receive a positive COVID-19 letter via [employer] or the mail soon with additional self-care information.    Would you like me to review some of that information with you now?  No    If you were tested for an upcoming procedure, please contact your provider for next steps.    Sima Woods

## 2023-03-24 NOTE — PATIENT INSTRUCTIONS
Hearing Aid Information       Right    Left   Make: Oticon Make: Oticon   Model: MORE 2 MINI RITE R Model: MORE 2 MINI RITE R   Serial Number: [id number] Serial Number: [id number]   Date of Purchase: 11/02/21 Date of Purchase: 11/02/21   Repair Warranty Exp: 11/02/21 Repair Warranty Exp: 11/02/24   L&D Warranty Exp: 11/02/24 L&D Warranty Exp: 11/02/24   Color:  (STEEL GREY) Color:  (STEEL GREY)   Battery:  (RECHARGEABLE) Battery:  (RECHARGEABLE)     Patient dropped off left hearing aid for repair. Aid is dead even after cleaning and  replacement. Sent to [facility] for repair under warranty. Call pt when aid arrives back in office.      Juan A Vasquez Recommendations from today's MTM visit:                                                    MTM (medication therapy management) is a service provided by a clinical pharmacist designed to help you get the most of out of your medicines.     No medication changes today. Continue Lamictal 200mg daily.     Next MTM visit: 6 months    To schedule another MTM appointment, please call the clinic directly or you may call the MTM scheduling line at [phone] or toll-free at [phone].     My Clinical Pharmacist's contact information:                                                      It was a pleasure seeing you today!  Please feel free to contact me with any questions or concerns you have.      Andra Brumfield, PharmD, BCPP  Medication Therapy Management Pharmacist  [facility]  [phone]    You may receive a survey about the MTM services you received.  I would appreciate your feedback to help me serve you better in the future. Please fill it out and return it when you can. Your comments will be anonymous.

## 2023-04-20 ENCOUNTER — PATIENT OUTREACH (OUTPATIENT)
Dept: CARE COORDINATION | Facility: CLINIC | Age: 63
End: 2023-04-20
Payer: COMMERCIAL

## 2023-05-04 ENCOUNTER — PATIENT OUTREACH (OUTPATIENT)
Dept: CARE COORDINATION | Facility: CLINIC | Age: 63
End: 2023-05-04
Payer: COMMERCIAL

## 2023-08-20 ENCOUNTER — HEALTH MAINTENANCE LETTER (OUTPATIENT)
Age: 63
End: 2023-08-20

## 2024-04-01 ENCOUNTER — OFFICE VISIT (OUTPATIENT)
Dept: URGENT CARE | Facility: URGENT CARE | Age: 64
End: 2024-04-01
Payer: COMMERCIAL

## 2024-04-01 ENCOUNTER — ANCILLARY PROCEDURE (OUTPATIENT)
Dept: GENERAL RADIOLOGY | Facility: CLINIC | Age: 64
End: 2024-04-01
Attending: FAMILY MEDICINE
Payer: COMMERCIAL

## 2024-04-01 VITALS
RESPIRATION RATE: 18 BRPM | DIASTOLIC BLOOD PRESSURE: 72 MMHG | HEART RATE: 71 BPM | OXYGEN SATURATION: 97 % | WEIGHT: 167.6 LBS | TEMPERATURE: 97.6 F | SYSTOLIC BLOOD PRESSURE: 123 MMHG | BODY MASS INDEX: 22.73 KG/M2

## 2024-04-01 DIAGNOSIS — Z72.0 TOBACCO ABUSE: ICD-10-CM

## 2024-04-01 DIAGNOSIS — R05.1 ACUTE COUGH: Primary | ICD-10-CM

## 2024-04-01 PROCEDURE — 99214 OFFICE O/P EST MOD 30 MIN: CPT | Performed by: FAMILY MEDICINE

## 2024-04-01 PROCEDURE — 71046 X-RAY EXAM CHEST 2 VIEWS: CPT | Mod: TC | Performed by: RADIOLOGY

## 2024-04-01 RX ORDER — BENZONATATE 200 MG/1
200 CAPSULE ORAL 3 TIMES DAILY PRN
Qty: 21 CAPSULE | Refills: 0 | Status: SHIPPED | OUTPATIENT
Start: 2024-04-01 | End: 2024-04-08

## 2024-04-01 RX ORDER — AZITHROMYCIN 250 MG/1
TABLET, FILM COATED ORAL
Qty: 6 TABLET | Refills: 0 | Status: SHIPPED | OUTPATIENT
Start: 2024-04-01 | End: 2024-04-06

## 2024-04-01 NOTE — PROGRESS NOTES
SUBJECTIVE: Colby Trent is a 63 year old male presenting with a chief complaint of cough .  Onset of symptoms was 10 day(s) ago.  Course of illness is worsening.    Severity moderate  Current and Associated symptoms: cough - non-productive  Treatment measures tried include Tylenol/Ibuprofen.  Predisposing factors include tobacco use.    Past Medical History:   Diagnosis Date    Bipolar disorder (H)      Allergies   Allergen Reactions    No Known Drug Allergy      Social History     Tobacco Use    Smoking status: Every Day     Packs/day: 1.00     Years: 40.00     Additional pack years: 0.00     Total pack years: 40.00     Types: Cigarettes    Smokeless tobacco: Never    Tobacco comments:     1/2 PPD   Substance Use Topics    Alcohol use: No       ROS:  SKIN: no rash  GI: no vomiting    OBJECTIVE:  /72   Pulse 71   Temp 97.6  F (36.4  C) (Tympanic)   Resp 18   Wt 76 kg (167 lb 9.6 oz)   SpO2 97%   BMI 22.73 kg/m  GENERAL APPEARANCE: healthy, alert and no distress  EYES: EOMI,  PERRL, conjunctiva clear  HENT: ear canals and TM's normal.  Nose and mouth without ulcers, erythema or lesions  RESP: lungs clear to auscultation - no rales, rhonchi or wheezes  SKIN: no suspicious lesions or rashes    Xray without acute findings, no pnemonia read by Tyler Dickens D.O.      ICD-10-CM    1. Acute cough  R05.1 XR Chest 2 Views     azithromycin (ZITHROMAX) 250 MG tablet     benzonatate (TESSALON) 200 MG capsule      2. Tobacco abuse  Z72.0 XR Chest 2 Views        Discontinue tobacco  Fluids/Rest, f/u if worse/not any better

## 2024-10-13 ENCOUNTER — HEALTH MAINTENANCE LETTER (OUTPATIENT)
Age: 64
End: 2024-10-13

## 2024-10-21 NOTE — PROGRESS NOTES
"SUBJECTIVE: Colby Trent is a 57 year old male presenting with a chief complaint of nasal congestion and cough .  Onset of symptoms was 1 week(s) ago.  Course of illness is same.    Severity moderate  Current and Associated symptoms: \"cold symptoms\"  Treatment measures tried include OTC meds.  Predisposing factors include tobacco abuse.    Past Medical History:   Diagnosis Date     Bipolar disorder (H)      Allergies   Allergen Reactions     No Known Drug Allergies      Social History   Substance Use Topics     Smoking status: Current Every Day Smoker     Packs/day: 1.00     Types: Cigarettes     Smokeless tobacco: Never Used      Comment: 1/2 PPD     Alcohol use No       ROS:  SKIN: no rash  GI: no vomiting    OBJECTIVE:  /85  Pulse 76  Temp 98.3  F (36.8  C) (Oral)  Wt 161 lb 11.2 oz (73.3 kg)  SpO2 94%  BMI 22.55 kg/t1XKHRVML APPEARANCE: healthy, alert and no distress  EYES: EOMI,  PERRL, conjunctiva clear  HENT: ear canals and TM's normal.  Nose and mouth without ulcers, erythema or lesions  NECK: supple, nontender, no lymphadenopathy  RESP: lungs clear to auscultation - no rales, rhonchi or wheezes  SKIN: no suspicious lesions or rashes      ICD-10-CM    1. Productive cough R05 azithromycin (ZITHROMAX) 250 MG tablet   2. Tobacco abuse Z72.0      Quit tobacco  Fluids/Rest, f/u if worse/not any better    "
declines

## 2025-07-28 ENCOUNTER — TRANSFERRED RECORDS (OUTPATIENT)
Dept: HEALTH INFORMATION MANAGEMENT | Facility: CLINIC | Age: 65
End: 2025-07-28
Payer: COMMERCIAL

## 2025-08-01 ENCOUNTER — TRANSFERRED RECORDS (OUTPATIENT)
Dept: HEALTH INFORMATION MANAGEMENT | Facility: CLINIC | Age: 65
End: 2025-08-01
Payer: COMMERCIAL